# Patient Record
Sex: FEMALE | Race: WHITE | Employment: UNEMPLOYED | URBAN - METROPOLITAN AREA
[De-identification: names, ages, dates, MRNs, and addresses within clinical notes are randomized per-mention and may not be internally consistent; named-entity substitution may affect disease eponyms.]

---

## 2022-05-29 ENCOUNTER — HOSPITAL ENCOUNTER (INPATIENT)
Age: 42
LOS: 5 days | Discharge: HOME | DRG: 751 | End: 2022-06-03
Attending: PSYCHIATRY & NEUROLOGY | Admitting: PSYCHIATRY & NEUROLOGY
Payer: MEDICAID

## 2022-05-29 ENCOUNTER — APPOINTMENT (OUTPATIENT)
Dept: CT IMAGING | Age: 42
DRG: 751 | End: 2022-05-29
Payer: MEDICAID

## 2022-05-29 DIAGNOSIS — F29 PSYCHOSIS, UNSPECIFIED PSYCHOSIS TYPE (HCC): Primary | ICD-10-CM

## 2022-05-29 LAB
ACETAMINOPHEN LEVEL: <5 UG/ML (ref 10–30)
ALBUMIN SERPL-MCNC: 3.5 G/DL (ref 3.5–4.6)
ALP BLD-CCNC: 52 U/L (ref 40–130)
ALT SERPL-CCNC: 16 U/L (ref 0–33)
AMPHETAMINE SCREEN, URINE: NORMAL
ANION GAP SERPL CALCULATED.3IONS-SCNC: 7 MEQ/L (ref 9–15)
AST SERPL-CCNC: 15 U/L (ref 0–35)
BARBITURATE SCREEN URINE: NORMAL
BASOPHILS ABSOLUTE: 0.1 K/UL (ref 0–0.2)
BASOPHILS RELATIVE PERCENT: 0.5 %
BENZODIAZEPINE SCREEN, URINE: NORMAL
BILIRUB SERPL-MCNC: <0.2 MG/DL (ref 0.2–0.7)
BILIRUBIN URINE: NEGATIVE
BLOOD, URINE: NEGATIVE
BUN BLDV-MCNC: 16 MG/DL (ref 6–20)
CALCIUM SERPL-MCNC: 8.5 MG/DL (ref 8.5–9.9)
CANNABINOID SCREEN URINE: NORMAL
CHLORIDE BLD-SCNC: 108 MEQ/L (ref 95–107)
CHOLESTEROL, TOTAL: 155 MG/DL (ref 0–199)
CLARITY: CLEAR
CO2: 24 MEQ/L (ref 20–31)
COCAINE METABOLITE SCREEN URINE: NORMAL
COLOR: YELLOW
CREAT SERPL-MCNC: 0.58 MG/DL (ref 0.5–0.9)
EOSINOPHILS ABSOLUTE: 0.1 K/UL (ref 0–0.7)
EOSINOPHILS RELATIVE PERCENT: 1.4 %
ETHANOL PERCENT: NORMAL G/DL
ETHANOL: <10 MG/DL (ref 0–0.08)
GFR AFRICAN AMERICAN: >60
GFR NON-AFRICAN AMERICAN: >60
GLOBULIN: 2.2 G/DL (ref 2.3–3.5)
GLUCOSE BLD-MCNC: 102 MG/DL (ref 70–99)
GLUCOSE URINE: NEGATIVE MG/DL
HCG(URINE) PREGNANCY TEST: NEGATIVE
HCT VFR BLD CALC: 37.9 % (ref 37–47)
HDLC SERPL-MCNC: 59 MG/DL (ref 40–59)
HEMOGLOBIN: 12.4 G/DL (ref 12–16)
KETONES, URINE: NEGATIVE MG/DL
LDL CHOLESTEROL CALCULATED: 87 MG/DL (ref 0–129)
LEUKOCYTE ESTERASE, URINE: NEGATIVE
LYMPHOCYTES ABSOLUTE: 2.1 K/UL (ref 1–4.8)
LYMPHOCYTES RELATIVE PERCENT: 20.4 %
Lab: NORMAL
MCH RBC QN AUTO: 30.2 PG (ref 27–31.3)
MCHC RBC AUTO-ENTMCNC: 32.7 % (ref 33–37)
MCV RBC AUTO: 92.5 FL (ref 82–100)
METHADONE SCREEN, URINE: NORMAL
MONOCYTES ABSOLUTE: 0.6 K/UL (ref 0.2–0.8)
MONOCYTES RELATIVE PERCENT: 5.9 %
NEUTROPHILS ABSOLUTE: 7.4 K/UL (ref 1.4–6.5)
NEUTROPHILS RELATIVE PERCENT: 71.8 %
NITRITE, URINE: NEGATIVE
OPIATE SCREEN URINE: NORMAL
OXYCODONE URINE: NORMAL
PDW BLD-RTO: 13.8 % (ref 11.5–14.5)
PH UA: 5.5 (ref 5–9)
PHENCYCLIDINE SCREEN URINE: NORMAL
PLATELET # BLD: 293 K/UL (ref 130–400)
POTASSIUM SERPL-SCNC: 4.5 MEQ/L (ref 3.4–4.9)
PROPOXYPHENE SCREEN: NORMAL
PROTEIN UA: NEGATIVE MG/DL
RBC # BLD: 4.1 M/UL (ref 4.2–5.4)
SALICYLATE, SERUM: <0.3 MG/DL (ref 15–30)
SARS-COV-2, NAAT: NOT DETECTED
SODIUM BLD-SCNC: 139 MEQ/L (ref 135–144)
SPECIFIC GRAVITY UA: 1.01 (ref 1–1.03)
TOTAL CK: 49 U/L (ref 0–170)
TOTAL PROTEIN: 5.7 G/DL (ref 6.3–8)
TRIGL SERPL-MCNC: 43 MG/DL (ref 0–150)
TSH SERPL DL<=0.05 MIU/L-ACNC: 0.39 UIU/ML (ref 0.44–3.86)
URINE REFLEX TO CULTURE: NORMAL
UROBILINOGEN, URINE: 0.2 E.U./DL
WBC # BLD: 10.3 K/UL (ref 4.8–10.8)

## 2022-05-29 PROCEDURE — 81003 URINALYSIS AUTO W/O SCOPE: CPT

## 2022-05-29 PROCEDURE — 82550 ASSAY OF CK (CPK): CPT

## 2022-05-29 PROCEDURE — 80307 DRUG TEST PRSMV CHEM ANLYZR: CPT

## 2022-05-29 PROCEDURE — 80143 DRUG ASSAY ACETAMINOPHEN: CPT

## 2022-05-29 PROCEDURE — 70450 CT HEAD/BRAIN W/O DYE: CPT

## 2022-05-29 PROCEDURE — 80061 LIPID PANEL: CPT

## 2022-05-29 PROCEDURE — 87635 SARS-COV-2 COVID-19 AMP PRB: CPT

## 2022-05-29 PROCEDURE — 84703 CHORIONIC GONADOTROPIN ASSAY: CPT

## 2022-05-29 PROCEDURE — 99285 EMERGENCY DEPT VISIT HI MDM: CPT

## 2022-05-29 PROCEDURE — 80179 DRUG ASSAY SALICYLATE: CPT

## 2022-05-29 PROCEDURE — 85025 COMPLETE CBC W/AUTO DIFF WBC: CPT

## 2022-05-29 PROCEDURE — 80053 COMPREHEN METABOLIC PANEL: CPT

## 2022-05-29 PROCEDURE — 82077 ASSAY SPEC XCP UR&BREATH IA: CPT

## 2022-05-29 PROCEDURE — 36415 COLL VENOUS BLD VENIPUNCTURE: CPT

## 2022-05-29 PROCEDURE — 84443 ASSAY THYROID STIM HORMONE: CPT

## 2022-05-29 PROCEDURE — 1240000000 HC EMOTIONAL WELLNESS R&B

## 2022-05-29 RX ORDER — HALOPERIDOL 5 MG
5 TABLET ORAL EVERY 6 HOURS PRN
Status: DISCONTINUED | OUTPATIENT
Start: 2022-05-29 | End: 2022-06-03 | Stop reason: HOSPADM

## 2022-05-29 RX ORDER — TRAZODONE HYDROCHLORIDE 50 MG/1
50 TABLET ORAL NIGHTLY PRN
Status: DISCONTINUED | OUTPATIENT
Start: 2022-05-29 | End: 2022-06-03 | Stop reason: HOSPADM

## 2022-05-29 RX ORDER — MAGNESIUM HYDROXIDE/ALUMINUM HYDROXICE/SIMETHICONE 120; 1200; 1200 MG/30ML; MG/30ML; MG/30ML
30 SUSPENSION ORAL PRN
Status: DISCONTINUED | OUTPATIENT
Start: 2022-05-29 | End: 2022-06-03 | Stop reason: HOSPADM

## 2022-05-29 RX ORDER — ACETAMINOPHEN 325 MG/1
650 TABLET ORAL EVERY 4 HOURS PRN
Status: DISCONTINUED | OUTPATIENT
Start: 2022-05-29 | End: 2022-06-03 | Stop reason: HOSPADM

## 2022-05-29 RX ORDER — HYDROXYZINE PAMOATE 50 MG/1
50 CAPSULE ORAL EVERY 6 HOURS PRN
Status: DISCONTINUED | OUTPATIENT
Start: 2022-05-29 | End: 2022-06-03 | Stop reason: HOSPADM

## 2022-05-29 RX ORDER — HYDROXYZINE HYDROCHLORIDE 50 MG/ML
50 INJECTION, SOLUTION INTRAMUSCULAR EVERY 6 HOURS PRN
Status: DISCONTINUED | OUTPATIENT
Start: 2022-05-29 | End: 2022-06-03 | Stop reason: HOSPADM

## 2022-05-29 RX ORDER — BENZTROPINE MESYLATE 1 MG/ML
2 INJECTION INTRAMUSCULAR; INTRAVENOUS 2 TIMES DAILY PRN
Status: DISCONTINUED | OUTPATIENT
Start: 2022-05-29 | End: 2022-06-03 | Stop reason: HOSPADM

## 2022-05-29 RX ORDER — HALOPERIDOL 5 MG/ML
5 INJECTION INTRAMUSCULAR EVERY 6 HOURS PRN
Status: DISCONTINUED | OUTPATIENT
Start: 2022-05-29 | End: 2022-06-03 | Stop reason: HOSPADM

## 2022-05-29 ASSESSMENT — ENCOUNTER SYMPTOMS
ALLERGIC/IMMUNOLOGIC NEGATIVE: 1
EYE PAIN: 0
COLOR CHANGE: 0
TROUBLE SWALLOWING: 0
ABDOMINAL PAIN: 0
SHORTNESS OF BREATH: 0
APNEA: 0

## 2022-05-29 ASSESSMENT — PAIN - FUNCTIONAL ASSESSMENT: PAIN_FUNCTIONAL_ASSESSMENT: NONE - DENIES PAIN
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
## 2022-05-29 NOTE — ED NOTES
Pt left with security via a W/C with - nurse to CT scan of her head to be completed     Yfn Soria RN  05/29/22 4487

## 2022-05-29 NOTE — ED NOTES
Pt was given her dinner at the bedside, she is quiet and cooperative with even respirations no problems and no C/O any kind expressed.      Reine Holter, RN  05/29/22 7908

## 2022-05-29 NOTE — ED NOTES
Patient admitted to [facility] bed 2. Oriented to unit and changed into psych safe clothing. Patient cooperative with skin check, Covid screen and able to provide urine specimen. Patient noted to have LLE edema and states she stepped on a staple 2 weeks ago and received treatment at urgent care. Peeling area noted below toes on dorsal aspect of foot.       Dunia Arriaga RN  05/29/22 4103

## 2022-05-29 NOTE — ED NOTES
Pt is back from CT in the bed area quiet and cooperative with the CT being completed resting in the bed area.      Deena Aragon RN  05/29/22 6458

## 2022-05-29 NOTE — ED NOTES
Pt is resting in bed area quiet and cooperative with no problems and no C/O any kind expressed.      Guy Duarte RN  05/29/22 6213

## 2022-05-29 NOTE — ED NOTES
Call placed to lab and Naman Dorsey notified of new orders.       Laurent Phelan, MADISON  05/29/22 6567

## 2022-05-29 NOTE — ED NOTES
Call placed to 3W for bed assignment and spoke with Radha. Patient to be admitted to room 390. Report to be called after shift change.       William King RN  05/29/22 2120

## 2022-05-29 NOTE — ED NOTES
Patient reviewed with Dr. Jennifer Jenkins. Received order to admit patient to .      Madalyn Calhoun RN  05/29/22 4727
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
## 2022-05-29 NOTE — ED PROVIDER NOTES
[facility] ED  EMERGENCY DEPARTMENT ENCOUNTER      Pt Name: Torie Ayala  MRN: [id number]  [place] 1980  Date of evaluation: 5/29/2022  Provider: Delta Handing, PA-C    CHIEF COMPLAINT       Chief Complaint   Patient presents with    Psychiatric Evaluation     hearing voices (pt denies si/hi)          HISTORY OF PRESENT ILLNESS   (Location/Symptom, Timing/Onset, Context/Setting, Quality, Duration, Modifying Factors, Severity)  Note limiting factors. Torie Ayala is a 38 y.o. female who presents to the emergency department [facility] to patient stating auditory hallucinations. Patient openly admits to \"hearing ghosts\" but has been ongoing since her daughter overdosed. Patient states that ghost tell her to take care of herself and not kill herself, and she denies any suicidal or homicidal ideation. Patient denies any visual hallucinations. Patient stepped on a staple approximately 2 weeks ago but was on antibiotics for this and denies any other recent illnesses. HPI    Nursing Notes were reviewed. REVIEW OF SYSTEMS    (2-9 systems for level 4, 10 or more for level 5)     Review of Systems   Constitutional: Negative for diaphoresis and fever. HENT: Negative for hearing loss and trouble swallowing. Eyes: Negative for pain. Respiratory: Negative for apnea and shortness of breath. Cardiovascular: Negative for chest pain. Gastrointestinal: Negative for abdominal pain. Endocrine: Negative. Genitourinary: Negative for hematuria. Musculoskeletal: Negative for neck pain and neck stiffness. Skin: Negative for color change. Allergic/Immunologic: Negative. Neurological: Negative for dizziness and numbness. Hematological: Negative. Psychiatric/Behavioral: Positive for hallucinations. All other systems reviewed and are negative. Except as noted above the remainder of the review of systems was reviewed and negative. PAST MEDICAL HISTORY   History reviewed. No pertinent past medical history. SURGICAL HISTORY     History reviewed. No pertinent surgical history. CURRENT MEDICATIONS       Previous Medications    No medications on file       ALLERGIES     Latex    FAMILY HISTORY     History reviewed. No pertinent family history. SOCIAL HISTORY       Social History     Socioeconomic History    Marital status: Single     Spouse name: None    Number of children: None    Years of education: None    Highest education level: None   Occupational History    None   Tobacco Use    Smoking status: Current Every Day Smoker    Smokeless tobacco: Never Used   Substance and Sexual Activity    Alcohol use: Never    Drug use: Yes     Types: Methamphetamines (Crystal Meth)    Sexual activity: None   Other Topics Concern    None   Social History Narrative    None     Social Determinants of Health     Financial Resource Strain:     Difficulty of Paying Living Expenses: Not on file   Food Insecurity:     Worried About Running Out of Food in the Last Year: Not on file    Judy of Food in the Last Year: Not on file   Transportation Needs:     Lack of Transportation (Medical): Not on file    Lack of Transportation (Non-Medical):  Not on file   Physical Activity:     Days of Exercise per Week: Not on file    Minutes of Exercise per Session: Not on file   Stress:     Feeling of Stress : Not on file   Social Connections:     Frequency of Communication with Friends and Family: Not on file    Frequency of Social Gatherings with Friends and Family: Not on file    Attends Caodaism Services: Not on file    Active Member of Clubs or Organizations: Not on file    Attends Club or Organization Meetings: Not on file    Marital Status: Not on file   Intimate Partner Violence:     Fear of Current or Ex-Partner: Not on file    Emotionally Abused: Not on file    Physically Abused: Not on file    Sexually Abused: Not on file   Housing Stability:     Unable to Pay for Housing in the Last Year: Not on file    Number of Places Lived in the Last Year: Not on file    Unstable Housing in the Last Year: Not on file       SCREENINGS        Jes Coma Scale  Eye Opening: Spontaneous  Best Verbal Response: Oriented  Best Motor Response: Obeys commands  Jes Coma Scale Score: 15               PHYSICAL EXAM    (up to 7 for level 4, 8 or more for level 5)     ED Triage Vitals [05/29/22 1618]   BP Temp Temp Source Heart Rate Resp SpO2 Height Weight   132/83 98.4 °F (36.9 °C) Oral 91 18 100 % 5' 9\" (1.753 m) 178 lb (80.7 kg)       Physical Exam  Vitals and nursing note reviewed. Constitutional:       General: She is not in acute distress. Appearance: She is well-developed. She is not diaphoretic. HENT:      Head: Normocephalic and atraumatic. Mouth/Throat:      Pharynx: No oropharyngeal exudate. Eyes:      General: No scleral icterus. Conjunctiva/sclera: Conjunctivae normal.      Pupils: Pupils are equal, round, and reactive to light. Neck:      Trachea: No tracheal deviation. Cardiovascular:      Rate and Rhythm: Normal rate. Heart sounds: Normal heart sounds. Pulmonary:      Effort: Pulmonary effort is normal. No respiratory distress. Breath sounds: Normal breath sounds. Abdominal:      General: Bowel sounds are normal. There is no distension. Palpations: Abdomen is soft. Musculoskeletal:         General: Normal range of motion. Cervical back: Normal range of motion and neck supple. Skin:     General: Skin is warm and dry. Findings: No erythema or rash. Neurological:      Mental Status: She is alert and oriented to person, place, and time. Cranial Nerves: No cranial nerve deficit. Motor: No abnormal muscle tone. Psychiatric:         Attention and Perception: She perceives auditory hallucinations. She does not perceive visual hallucinations.          Behavior: Behavior normal.         Thought Content: Thought content normal. Thought content does not include homicidal or suicidal ideation. Judgment: Judgment normal.         DIAGNOSTIC RESULTS     EKG: All EKG's are interpreted by the Emergency Department Physician who either signs or Co-signs this chart in the absence of a cardiologist.        RADIOLOGY:   Non-plain film images such as CT, Ultrasound and MRI are read by the radiologist. Plain radiographic images are visualized and preliminarily interpreted by the emergency physician with the below findings:        Interpretation per the Radiologist below, if available at the time of this note:    CT Head WO Contrast    (Results Pending)         ED BEDSIDE ULTRASOUND:   Performed by ED Physician - none    LABS:  Labs Reviewed   ACETAMINOPHEN LEVEL - Abnormal; Notable for the following components:       Result Value    Acetaminophen Level <5 (*)     All other components within normal limits   CBC WITH AUTO DIFFERENTIAL - Abnormal; Notable for the following components:    RBC 4.10 (*)     MCHC 32.7 (*)     Neutrophils Absolute 7.4 (*)     All other components within normal limits   COMPREHENSIVE METABOLIC PANEL - Abnormal; Notable for the following components:    Chloride 108 (*)     Anion Gap 7 (*)     Glucose 102 (*)     Total Protein 5.7 (*)     Globulin 2.2 (*)     All other components within normal limits   SALICYLATE LEVEL - Abnormal; Notable for the following components:    Salicylate, Serum <4.0 (*)     All other components within normal limits   TSH - Abnormal; Notable for the following components:    TSH 0.386 (*)     All other components within normal limits   COVID-19, RAPID   CK   ETHANOL   LIPID PANEL   PREGNANCY, URINE   URINE DRUG SCREEN   URINALYSIS WITH REFLEX TO CULTURE       All other labs were within normal range or not returned as of this dictation.     EMERGENCY DEPARTMENT COURSE and DIFFERENTIAL DIAGNOSIS/MDM:   Vitals:    Vitals:    05/29/22 1618   BP: 132/83   Pulse: 91   Resp: 18   Temp: 98.4 °F (36.9 °C)   TempSrc: Oral   SpO2: 100%   Weight: 178 lb (80.7 kg)   Height: 5' 9\" (1.753 m)         MDM      REASSESSMENT        Patient was seen and evaluated and admitted by psychiatry after medical clearance was obtained    CONSULTS:  IP CONSULT TO HOSPITALIST  IP CONSULT TO SOCIAL WORK    PROCEDURES:  Unless otherwise noted below, none     Procedures        FINAL IMPRESSION      1. Psychosis, unspecified psychosis type [facility])          DISPOSITION/PLAN   DISPOSITION Decision To Admit 05/29/2022 06:52:26 PM      PATIENT REFERRED TO:  No follow-up provider specified. DISCHARGE MEDICATIONS:  New Prescriptions    No medications on file     Controlled Substances Monitoring:     No flowsheet data found.     (Please note that portions of this note were completed with a voice recognition program.  Efforts were made to edit the dictations but occasionally words are mis-transcribed.)    Leonardo Jones PA-C (electronically signed)  Attending Emergency Physician            Leonardo Jones PA-C  05/29/22 0683

## 2022-05-30 PROCEDURE — 6370000000 HC RX 637 (ALT 250 FOR IP): Performed by: PSYCHIATRY & NEUROLOGY

## 2022-05-30 PROCEDURE — 1240000000 HC EMOTIONAL WELLNESS R&B

## 2022-05-30 RX ORDER — RISPERIDONE 0.5 MG/1
0.5 TABLET, FILM COATED ORAL 2 TIMES DAILY
Status: DISCONTINUED | OUTPATIENT
Start: 2022-05-30 | End: 2022-06-03 | Stop reason: HOSPADM

## 2022-05-30 RX ADMIN — RISPERIDONE 0.5 MG: 0.5 TABLET ORAL at 20:34

## 2022-05-30 RX ADMIN — NICOTINE POLACRILEX 2 MG: 2 GUM, CHEWING BUCCAL at 17:56

## 2022-05-30 RX ADMIN — RISPERIDONE 0.5 MG: 0.5 TABLET ORAL at 11:39

## 2022-05-30 RX ADMIN — NICOTINE POLACRILEX 2 MG: 2 GUM, CHEWING BUCCAL at 22:05

## 2022-05-30 RX ADMIN — NICOTINE POLACRILEX 2 MG: 2 GUM, CHEWING BUCCAL at 15:54

## 2022-05-30 ASSESSMENT — SLEEP AND FATIGUE QUESTIONNAIRES
SLEEP PATTERN: DIFFICULTY FALLING ASLEEP;RESTLESSNESS
DO YOU USE A SLEEP AID: NO
DO YOU HAVE DIFFICULTY SLEEPING: YES
AVERAGE NUMBER OF SLEEP HOURS: 6

## 2022-05-30 ASSESSMENT — PATIENT HEALTH QUESTIONNAIRE - PHQ9
SUM OF ALL RESPONSES TO PHQ QUESTIONS 1-9: 13
SUM OF ALL RESPONSES TO PHQ QUESTIONS 1-9: 13

## 2022-05-30 NOTE — GROUP NOTE
Group Therapy Note    Date: 5/30/2022    Group Start Time: 1900  Group End Time: 1945  Group Topic: Recreational    [facility]    [place]        Group Therapy Note    Attendees: 14/22         Patient's Goal:  To participate in activity group. Notes:  Patient participated in group with peers.      Status After Intervention:  Unchanged    Participation Level: Interactive    Participation Quality: Appropriate and Attentive      Speech:  normal      Thought Process/Content: Logical      Affective Functioning: Congruent      Mood: euthymic      Level of consciousness:  Alert and Attentive      Response to Learning: Progressing to goal      Endings: None Reported    Modes of Intervention: Activity      Discipline Responsible: [address] [facility] [address] Tech      Signature:  [place]

## 2022-05-30 NOTE — PROGRESS NOTES
Pt reports depression & anxiety as being 1/10. Denies SI/HI/VH. , Pt does admit to AH,States she hears faint voices ( reports 12 yo son can hear them also.) Reports the voices are there to \"help\" her not hurt her. Also reports the voices are improved after 1st dose of RisperdalPt reports 6-8 hrs sleep each night. Pt reports she will attend groups tomorrow.

## 2022-05-30 NOTE — ED NOTES
Unable to reach [facility] at this time for belongings and transport to [facility]  05/29/22 1732

## 2022-05-30 NOTE — PROGRESS NOTES
Pt was given medication education on Risperdal. Pt informed to come to staff with questions. Pt agrees.  Electronically signed by Mera Murphy LPN on 1/59/6735 at 2:67 PM
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
## 2022-05-30 NOTE — GROUP NOTE
Group Therapy Note    Date: 5/30/2022    Group Start Time: 8962  Group End Time: 1493  Group Topic: Healthy Living/Wellness    [facility]    Tahmina Calderon        Group Therapy Note    Attendees: 16/21         Patient's Goal:  To practice using coping skills. Notes:  Patient participated in group with peers.      Status After Intervention:  Unchanged    Participation Level: Interactive    Participation Quality: Appropriate, Attentive and Supportive      Speech:  normal      Thought Process/Content: Logical      Affective Functioning: Congruent      Mood: euthymic      Level of consciousness:  Alert and Attentive      Response to Learning: Progressing to goal      Endings: None Reported    Modes of Intervention: Education      Discipline Responsible: [address], [employer] Tech      Signature:  Tahmina Calderon

## 2022-05-30 NOTE — ED NOTES
[place] called to bring belongings and transport to [facility], [address] [facility]  05/29/22 5705

## 2022-05-30 NOTE — PROGRESS NOTES
Patient arrived to the unit via wheelchair accompanied by staff. Skin assessment and contraband search complete by this nurse and Saul Humphreys. No contraband found. Pts left lower extremity is swollen. Pt reports she stepped on a staple two weeks ago and received antibiotics at the urgent care. On the bottom of pts left foot there is some peeling around the scab where she stepped on the staple. No redness noted.

## 2022-05-30 NOTE — PROGRESS NOTES
Pt. declined to attend the 0900 community meeting, despite staff encouragement.  Goal - \"To see the Doctor\" Electronically signed by DEREK Martinez on 5/30/2022 at 10:09 AM

## 2022-05-30 NOTE — CONSULTS
[place] [address] MEDICINE    HISTORY AND PHYSICAL EXAM    PATIENT NAME:  Giselle Ayers    MRN:  [id number]  SERVICE DATE:  5/30/2022   SERVICE TIME:  9:14 AM    Primary Care Physician: No primary care provider on file. SUBJECTIVE  CHIEF COMPLAINT:  Medically appropriate for inpatient psychiatry admission. Consult for medical H/P encounter. HPI:  This is a 38 y.o. female who presents with PMHx anxiety, presented to emergency room pink slipped by [facility] due to [facility]. Reports hearing ghosts. Symptoms ongoing since her daughter overdosed. CTH negative. Patient cleared from emergency room for psychiatric care. Reports she recently stepped on a staple and was treated with keflex. Reports small wound to area that still causes pain when ambulating. No open areas however LLE is edematous without erythema. Patient Seen, Chart, Labs, Radiologystudies, and Consults reviewed. Patient denies headache, chest pain, shortness of breath, N/V/D/C, fever/chills. PAST MEDICAL HISTORY:  History reviewed. No pertinent past medical history. PAST SURGICAL HISTORY:  History reviewed. No pertinent surgical history. FAMILY HISTORY:  History reviewed. No pertinent family history.   SOCIAL HISTORY:    Social History     Socioeconomic History    Marital status: Single     Spouse name: Not on file    Number of children: Not on file    Years of education: Not on file    Highest education level: Not on file   Occupational History    Not on file   Tobacco Use    Smoking status: Current Every Day Smoker    Smokeless tobacco: Never Used   Substance and Sexual Activity    Alcohol use: Never    Drug use: Yes     Types: Methamphetamines (Crystal Meth)    Sexual activity: Not on file   Other Topics Concern    Not on file   Social History Narrative    Not on file     Social Determinants of Health     Financial Resource Strain:     Difficulty of Paying Living Expenses: Not on file   Food Insecurity:     Worried About Running Out of Food in the Last Year: Not on file    Ran Out of Food in the Last Year: Not on file   Transportation Needs:     Lack of Transportation (Medical): Not on file    Lack of Transportation (Non-Medical):  Not on file   Physical Activity:     Days of Exercise per Week: Not on file    Minutes of Exercise per Session: Not on file   Stress:     Feeling of Stress : Not on file   Social Connections:     Frequency of Communication with Friends and Family: Not on file    Frequency of Social Gatherings with Friends and Family: Not on file    Attends Scientology Services: Not on file    Active Member of Clubs or Organizations: Not on file    Attends Club or Organization Meetings: Not on file    Marital Status: Not on file   Intimate Partner Violence:     Fear of Current or Ex-Partner: Not on file    Emotionally Abused: Not on file    Physically Abused: Not on file    Sexually Abused: Not on file   Housing Stability:     Unable to Pay for Housing in the Last Year: Not on file    Number of Places Lived in the Last Year: Not on file    Unstable Housing in the Last Year: Not on file     MEDICATIONS:    Current Facility-Administered Medications   Medication Dose Route Frequency Provider Last Rate Last Admin    acetaminophen (TYLENOL) tablet 650 mg  650 mg Oral Q4H PRN Leopold Garner, MD        magnesium hydroxide (MILK OF MAGNESIA) 400 MG/5ML suspension 30 mL  30 mL Oral Daily PRN Leopold Garner, MD        aluminum & magnesium hydroxide-simethicone (MAALOX) 200-200-20 MG/5ML suspension 30 mL  30 mL Oral PRN Leopold Garner, MD        haloperidol (HALDOL) tablet 5 mg  5 mg Oral Q6H PRN Leopold Garner, MD        Or    haloperidol lactate (HALDOL) injection 5 mg  5 mg IntraMUSCular Q6H PRN Leopold Garner, MD        benztropine mesylate (COGENTIN) injection 2 mg  2 mg IntraMUSCular BID PRN Leopold Garner, MD        traZODone (DESYREL) tablet 50 mg  50 mg Oral Nightly PRN Leopold Garner, MD [place] hydrOXYzine (VISTARIL) injection 50 mg  50 mg IntraMUSCular Q6H PRN Bhavya Haney MD        Or    hydrOXYzine (VISTARIL) capsule 50 mg  50 mg Oral Q6H PRN Bhavya Haney MD           ALLERGIES: Latex    REVIEW OF SYSTEM:   ROS as noted in HPI, 12 point ROS reviewed and otherwise negative. OBJECTIVE  PHYSICAL EXAM: /60   Pulse 94   Temp 99.3 °F (37.4 °C) (Oral)   Resp 14   Ht 5' 9\" (1.753 m)   Wt 178 lb (80.7 kg)   LMP 04/28/2022 (Approximate)   SpO2 99%   BMI 26.29 kg/m²   CONSTITUTIONAL:  awake, alert, cooperative, no apparent distress, and appears stated age  EYES:  Lids and lashes normal, conjunctiva normal  ENT:  Normocephalic, without obvious abnormality, atraumatic, sinuses nontender on palpation, external ears without lesions, oral pharynx with moist mucus membranes, tonsils without erythema or exudates, gums normal and good dentition. NECK:  Supple, symmetrical, trachea midline  LUNGS: Clear to auscultation bilaterally, no crackles or wheezing  CARDIOVASCULAR:  Regular rate and rhythm, normal S1 and S2  ABDOMEN:  Normal bowel sounds, soft, non-distended, non-tender  MUSCULOSKELETAL:  There is no redness, warmth, or swelling of the joints. NEUROLOGIC:  Awake, alert, oriented to name, place and time. SKIN:  Warm and dry    DATA:     Diagnostic tests reviewed for today's visit:    Most recent labs and imaging results reviewed. ASSESSMENT AND PLAN   Psychosis [facility])  Patient admitted to behavorial health for evaluation and treatment     Encounter for H&P    LLE edema: Check venous US to r/o DVT    This is only a history and physical examination and not medical management. The patient is to contact and follow up with their primary care physician and go over any abnormal labs, imaging, findings, medical concerns, or conditions that we have and have not addressed during this encounter.     Plan of care discussed with: patient    SIGNATURE: ANABELA Wahl NP  DATE: May 30, 2022  TIME: 9:14 AM

## 2022-05-30 NOTE — PROGRESS NOTES
Pt. refused to attend the 1000 skills group, despite staff encouragement. Electronically signed by Ranae Scheuermann, [address] on 5/30/2022 at 11:54 AM

## 2022-05-30 NOTE — CARE COORDINATION
Independent X  Dependent    Minimal Assist      Comments:  previous admissions    Psychosocial High Risk Factors (check all that apply)     Unable to obtain meds   Chronic illness/pain    Substance abuse   Lack of Family Support  x   Financial stress x  Isolation   Inadequate Community Resources   Suicide attempt(s)   Not taking medications  X   Victim of crime   Developmental Delay  Unable to manage personal needs    Age 72 or older   Homeless   No transportation    Readmission within 30 days  Unemployment   Traumatic Event     Family/Supports identified:does have family support as well as friends.     Sexual Orientation:  Did not disclose      Patient Strengths: able to articulate needs     Patient Barriers:  hard to control moods/ reports of auditory hallucinations that she is reporting is ghosts     Safety plan: none at this time, patient reports she is safe.      CMHC/MH history: history of hospitalizations, does not follow through with outpatient [address] [place] [address] Shay of Care:  medication management, group/individual therapies, family meetings, psycho -education, treatment team meetings to assist with stabilization     Initial Discharge Plan:  able to return home aunt and uncle.     Clinical Summary: patient reports that she has been having issues with auditory hallucinations and that she has been having a hard time coping since her daughter had an OD about a year ago. She reports that ghosts are telling her to help her and it has been increasing but having a hard time with regulating her emotions. Because of this patients child was taken from her temporarily and this is causing some distress. Patient was taken to [facility] to due above conditions and admitted for observation.

## 2022-05-30 NOTE — PROGRESS NOTES
Patient had a sad affect, she was worrisome, tearful but she was cooperative and agreeable to do her leisure assessment. Patient stated she does not know if she is depressed but she is stressed. Stressors are not having custody of her son, being homeless, staying at a hotel and she hears voices, stating Randeescott Gamble want me to help them and I can't help them\". Patient stated \"their is voices, ghosts, spirits or something\". She stated she has not been the same since she saw her daughter overdose last year and she had to bring her back. She is also grieving the death of her grandfather last year. She denies using drugs or drinking alcohol. She has some relationship issues with her mom, brother and her nephew. Her son is staying with her nephew at this time. She is hopeful about her recovery. She enjoys gardening and being outside.  Electronically signed by Leanne Huffman, [address] on 5/30/2022 at 2:27 PM

## 2022-05-30 NOTE — CARE COORDINATION
Brief Intervention and Referral to Treatment Summary    Patient was provided PHQ-9, AUDIT-C and DAST Screening:      PHQ-9 Score: 7  AUDIT-C Score:  0  DAST Score:  0    Patients substance use is considered     Low Risk/Healthy x  Moderate Risk   Harmful  Dependent    Patients depression is considered:     Minimal  Mild   Moderate x  Moderately Severe   Severe    Brief Education Was Provided    Patient was receptive  Patient was not receptive x      Brief Intervention Is Provided (Only for AUDIT-C or DAST)     Patient reports readiness to decrease and/or stop use and a plan was discussed   Patient denies readiness to decrease and/or stop use and a plan was not discussed x      Recommendations/Referrals for Brief and/or Specialized Treatment Provided to Patient   Patient did not disclose any sort of substance abuse issues and as a result not information was given to patient at this time.

## 2022-05-31 ENCOUNTER — APPOINTMENT (OUTPATIENT)
Dept: ULTRASOUND IMAGING | Age: 42
DRG: 751 | End: 2022-05-31
Payer: MEDICAID

## 2022-05-31 LAB
EKG ATRIAL RATE: 86 BPM
EKG P AXIS: -2 DEGREES
EKG P-R INTERVAL: 132 MS
EKG Q-T INTERVAL: 330 MS
EKG QRS DURATION: 78 MS
EKG QTC CALCULATION (BAZETT): 394 MS
EKG R AXIS: 66 DEGREES
EKG T AXIS: 51 DEGREES
EKG VENTRICULAR RATE: 86 BPM

## 2022-05-31 PROCEDURE — 99232 SBSQ HOSP IP/OBS MODERATE 35: CPT | Performed by: PSYCHIATRY & NEUROLOGY

## 2022-05-31 PROCEDURE — 93005 ELECTROCARDIOGRAM TRACING: CPT | Performed by: PSYCHIATRY & NEUROLOGY

## 2022-05-31 PROCEDURE — 6370000000 HC RX 637 (ALT 250 FOR IP): Performed by: PSYCHIATRY & NEUROLOGY

## 2022-05-31 PROCEDURE — 1240000000 HC EMOTIONAL WELLNESS R&B

## 2022-05-31 PROCEDURE — 93971 EXTREMITY STUDY: CPT

## 2022-05-31 PROCEDURE — 93010 ELECTROCARDIOGRAM REPORT: CPT | Performed by: INTERNAL MEDICINE

## 2022-05-31 RX ADMIN — RISPERIDONE 0.5 MG: 0.5 TABLET ORAL at 09:24

## 2022-05-31 RX ADMIN — NICOTINE POLACRILEX 2 MG: 2 GUM, CHEWING BUCCAL at 18:17

## 2022-05-31 RX ADMIN — NICOTINE POLACRILEX 2 MG: 2 GUM, CHEWING BUCCAL at 13:09

## 2022-05-31 RX ADMIN — NICOTINE POLACRILEX 2 MG: 2 GUM, CHEWING BUCCAL at 09:58

## 2022-05-31 RX ADMIN — NICOTINE POLACRILEX 2 MG: 2 GUM, CHEWING BUCCAL at 15:57

## 2022-05-31 RX ADMIN — RISPERIDONE 0.5 MG: 0.5 TABLET ORAL at 21:27

## 2022-05-31 NOTE — H&P
Department of Psychiatry  History and Physical - Adult     CHIEF COMPLAINT:  Auditory hallucinations of 5 people , they are making her anxious and more paranoid then she is    History obtained from:  patient, electronic medical record    Patient was seen after discussing with the treatment team and reviewing the chart\    CIRCUMSTANCES OF ADMISSION: Patient was convinced by family to seek help [facility]. And they pinkslipped her. Patient reports 9 months of hearing voices , which are distracting maddening. Patient believes that voices belong to spirits. She said that her son independently hears voices of same, however they removed him from her  ([facility])    HISTORY OF PRESENT ILLNESS:      The patient is a 38 y.o. female with significant past history of mehamphetamine use, presents on a pink slip by [facility] for subacute hallucinations. Patient reports poor sleep, racing thoughts, panic attacks as well as voices. She believes these voices are the reality, she also believes hson was hearing them as well. Patient reports these voices have caused grave concerns. Clinical Summary:  per ed      Patient presented to the ED for a psychiatric evaluation after she was assessed and pink slipped by the [facility] team. Patient reports she has been experiencing auditory hallucinations for the past 9 months. She describes hearing five external voices and refers to each voice in context to their place in \"the family. \" States she hears \"the older father, mother and children. \" Patient describes the voices as male and female that speak truth to her. States \"it's crazy, they don't tell me to hurt myself or anything but they help me. \" Describes hearing the older male voice told her to check the washing machine which shouldn't have completed it's cycle but when she checked it was done.  The female voice told me while I was at the store to check my bank account because my tax return was there, it wasn't supposed to be their another week but it was there like she said. \" Patient verbalizes these voices do things to prove to her that they are real. States the voices also tell me \"the spirit world wants you. \" Patient verbalizes that her [de-identified] year old son and her dog also hear these voices. Patient cooperative with assessment and answers questions appropriately. Thought process organized with tangential speech. Appears to smile when discussing her psychosis. Tearful at times when she discusses the situation with her children and family. Denies SI, denies HI. Denies any self harm or suicide attempt prior to admit. Denies any history of suicide attempts or inpatient or outpatient behavioral health treatment. Affect incongruent with good eye contact. Patient states she would like to be evaluated to attempt to \"get her head straight\" so her family will allow her to get her son back. Patient admits she has been placing herself in danger recently and recognizes the need for her son to be with family while she receives treatment.          Stressors:lost her 10 yo in a custody torres    The patient is not currently receiving care for the above psychiatric illness. Medications Prior to Admission:   No medications prior to admission.     Compliance:none    Psychiatric Review of Systems       Depression: yes     Veena or Hypomania:  yes -      Panic Attacks:  no     Phobias:  no     Obsessions and Compulsions:  no     PTSD :      Hallucinations:  no     Delusions:  no    Substance Abuse History:  ETOH: no   Marijuana: denies  Opiates: done   Other Drugs: amphetamines      Past Psychiatric History:  Prior Diagnosis:  Meth duced psychossis  Psychiatrist: [id number]  Therapist:;;;;;;;;;;;;;;;;;;;;;;;;;;;;;;;;;;;;;;;;;;;;;;;;;;;;;;;;;;;;;;;;;;;;;;;;;;;;;;;;;;;;;;;;;;;;;;;;;;;;;;;;;;;;;;;;;;;;;;;;;;;;;;;;;;;;;;;;;;;;;;;;;;;;;;;;;;;;;;;;;;;;;;;;;;;;;;;;;;;;;;;;;;;;;;;;;;;;;;;;;;;;;;;;;;;;;;;;;;;;;;;;;;;;;;;;;;;;;;;;;;;;;;;;;;;;;;;;;;;;;;;;;;;;;;;;;;;;;;;;;;;;;;;;;;;;;;;;;;;;;;;;;;;;;;;;;;;;;;;;;;;;;;;;;;;;;;;;;;;;;;;;;;;;;;;;;;;;;;;;;;;;;;;;;;;;;;;;;;;;;;;;;;;;;;;;;;;;;;;;;;;;;;;;;;;;;;;;;;;;;;;;;;;;;;;;;;;;;;;;;;;;;;;;;;;;;;;;;;;;;;;;;;;;;;;;;;;;;;;;;;;;;;;;;;;;;;;;;;;;;;;;;;;;;;;;;;;;;;;;;;;;;;;;;;;;;;;;;;;;;;;;;;;;;;;;;;;;;;;;;;;;;;;;;;;;;;;;;;;;;;;;;;;;;;;;;;;;;;;;;;;;;;;;;;;;;;;;;;;;;;;;;;;;;;;;;;;;;;;;;;;;;;;;;;;;;;;;;;;;;;;;;;;;;;;;;;;;;;;;;;;;;;;;;;;;;;;;;;;;;;;;;;;;;;;;;;;;;;;;;;;;;;;;;;;;;;;;;;;;;;;;;;;;;;;;;;;;;;;;;;;;;;;;;;;;;;;;;;;;;;;;;;;;;;;;;;;  Hospitalization: yes  Hx of Suicidal Attempts: yes  Hx of violence:  yes  ECT: yes  Previous discontinued Psychiatric Med Trials:     Past Medical History:    History reviewed. No pertinent past medical history. Past Surgical History:    History reviewed. No pertinent surgical history. Allergies:   Latex    Family History  History reviewed. No pertinent family history. Social History:  Born and Raised: [place]  Describes Childhood:   neglected  Education: [employer]  Employment: Unemployed, not seeking work  Relationships:   Children: 2 children  Current Support: parents    Legal Hx: pending charges  Access to weapons?:  No      EXAMINATION: medically stable  Medically cleared by ed    REVIEW OF SYSTEMS:    ROS:  [x] All negative/unchanged except if checked.  Explain positive(checked items) below:  [] Constitutional  [] Eyes  [] Ear/Nose/Mouth/Throat  [] Respiratory  [] CV  [] GI  []   [] Musculoskeletal  [] Skin/Breast  [] Neurological  [] Endocrine  [] Heme/Lymph  [] Allergic/Immunologic    Explanation:     Vitals:  BP (!) 117/55   Pulse 100   Temp 100.2 °F (37.9 °C)   Resp 16   Ht 5' 9\" (1.753 m)   Wt 178 lb (80.7 kg)   LMP 04/28/2022 (Approximate)   SpO2 100%   BMI 26.29 kg/m²      Neurologic Exam:   Muscle Strength & Tone: full ROM  Gait: normal gait   Involuntary Movements: Yes    Mental Status Examination:    Level of consciousness:  within normal limits   Appearance:  ill-appearing  Behavior/Motor:  psychomotor retardation  Attitude toward examiner:  cooperative  Speech:  spontaneous and normal volume   Mood: angry, euphoric and labile  Affect:  mood congruent  Thought processes:  tangential   Thought content:  Preoccupied with auditory hallucinationsd  Homocidal ideation denies  Suicidal Ideation:  denies suicidal ideation  Delusions:  paranoid  Cognition:  oriented to person, place, and time   Concentration intact  Memory impaired immediate recall, recent memory and remote memory  Insight poorJudgement good   Fund of Knowledge adequate    Mini Mental Status 24/30      DIAGNOSIS:  Bipolar disorder   substance induced psychosis  Stimulant use disorder    RISK ASSESSMENT:    SUICIDE RISK ASSESSMENT:  HOMICIDE: denies  AGITATION/VIOLENCE: no  ELOPEMENT: no    LABS: REVIEWED TODAY:  Recent Labs     05/29/22  1659   WBC 10.3   HGB 12.4        Recent Labs     05/29/22  1659      K 4.5   *   CO2 24   BUN 16   CREATININE 0.58   GLUCOSE 102*     Recent Labs     05/29/22  1659   BILITOT <0.2   ALKPHOS 52   AST 15   ALT 16     Lab Results   Component Value Date    LABAMPH Neg 05/29/2022    BARBSCNU Neg 05/29/2022    LABBENZ Neg 05/29/2022    COCAINESCRN Negative 04/06/2022    LABMETH Neg 05/29/2022    OPIATESCREENURINE Neg 05/29/2022    PHENCYCLIDINESCREENURINE Neg 05/29/2022    ETOH <10 05/29/2022     Lab Results   Component Value Date    TSH 0.386 05/29/2022     No results found for: LITHIUM  No results found for: VALPROATE, CBMZ  No results found for: LITHIUM, VALPROATE    FURTHER LABS ORDERED :      Radiology none  CT Head WO Contrast    Result Date: 5/29/2022  Patient: Scottkaitlynn Wolfe  Time Out: 20:15 Exam(s): CT HEAD Without Contrast  EXAM:   CT Head Without Intravenous Contrast  CLINICAL HISTORY:    Reason for exam: new onset psychosis. Chief complaint new onset psychosis  TECHNIQUE:   Axial computed tomography images of the head/brain without intravenous contrast.  All CT scan at this facility use dose modulation, iterative reconstruction, and/or weight based dosing when appropriate to reduce radiation dose to as low as reasonably achievable. COMPARISON:   No relevant prior studies available. FINDINGS:   Brain:  No acute hemorrhage, large hypodensity, or significant mass effect. Ventricles:  No significant abnormality. Bones/joints:  No acute abnormality. Soft tissues:  No significant abnormality. Sinuses:  No significant abnormality. Mastoid air cells:  No significant abnormality. Electronically signed by Lula Awad MD on 05-29-22 at 2015      No acute intracranial abnormality. EKG: none TRACING REVIEWED    TREATMENT PLAN:    Risk Management:  close watch    Collateral Information:  Will obtain collateral information from the family or friends. Will obtain medical records as appropriate from out patient providers  Will consult the hospitalist for a physical exam to rule out any co-morbid physical condition. Home medication Reconciled       New Medications started during this admission :    See orders  Prn Haldol 5mg and Vistaril 50mg q6hr for extreme agitation. Trazodone as ordered for insomnia  Vistaril as ordered for anxiety  Discussed with the patient risk, benefit, alternative and common side effects for the  proposed medication treatment. Patient is consenting to the treatment.     Psychotherapy:   Encourage participation in milieu and group therapy  Individual therapy as needed        Behavioral Services  Medicare Certification      Admission Day 1  I certify that this patient's inpatient psychiatric hospital admission is medically necessary for:     (1) treatment which could reasonably be expected to improve this patient's condition, or     (2) diagnostic study or its equivalent.        Electronically signed by Zack Torres MD on 5/31/2022 at 12:42 AM

## 2022-05-31 NOTE — PROGRESS NOTES
Patient has been out with peers. States she's much better. Said the medication helped her almost right away; no more voices, no depression or anxiety. Denies all. Patient asked for prune juice for constipation and received it  Left leg still significantly swollen compared to the other, +1 edema. DUP negative for DVT.

## 2022-05-31 NOTE — GROUP NOTE
Group Therapy Note    Date: 5/31/2022    Group Start Time: 1100  Group End Time: 3118  Group Topic: Psychotherapy    [facility]    REBEKAH Vanessa        Group Therapy Note    Attendees: 8/22 (two at ect)          Patient's Goal:  \"discharge and go to a custody hearing for my son. \"    Notes:  Left early to see MD    Status After Intervention:  Unchanged    Participation Level: Minimal    Participation Quality: Appropriate      Speech:  normal      Thought Process/Content: Delusional      Affective Functioning: Flat      Mood: anxious      Level of consciousness:  Alert      Response to Learning: Progressing to goal      Endings: None Reported    Modes of Intervention: Education      Discipline Responsible: /Counselor      Signature:  REBEKAH Vanessa

## 2022-05-31 NOTE — PROGRESS NOTES
Behavioral Services  Medicare Certification Upon Admission    I certify that this patient's inpatient psychiatric hospital admission is medically necessary for:    [x] (1) Treatment which could reasonably be expected to improve this patient's condition,       [x] (2) Or for diagnostic study;     AND     [x](2) The inpatient psychiatric services are provided while the individual is under the care of a physician and are included in the individualized plan of care. Estimated length of stay/service 3-5 days    Plan for post-hospital care OP care      Electronically signed by Larry Santos MD on 5/31/2022 at 4:41 PM                 [address] NOTE       5/31/2022     Patient was seen and examined in person, Chart reviewed   Patient's case discussed with staff/team    Chief Complaint: psychosis    Interim History:     Pt has been seclusive in her room  Appeared paranoid and suspicious  Less intense AH present since taking medication  Pt sleep was disturbed  Worried about the custody issue  Feeling sad and depressed  Appetite:   [] Normal/Unchanged  [] Increased  [x] Decreased      Sleep:       [] Normal/Unchanged  [] Fair       [x] Poor              Energy:    [] Normal/Unchanged  [] Increased  [x] Decreased        SI [] Present  [x] Absent    HI  []Present  [x] Absent     Aggression:  [] yes  [] no    Patient is [] able  [x] unable to CONTRACT FOR SAFETY     PAST MEDICAL/PSYCHIATRIC HISTORY:   History reviewed. No pertinent past medical history. FAMILY/SOCIAL HISTORY:  History reviewed. No pertinent family history.   Social History     Socioeconomic History    Marital status: Single     Spouse name: Not on file    Number of children: Not on file    Years of education: Not on file    Highest education level: Not on file   Occupational History    Not on file   Tobacco Use    Smoking status: Current Every Day Smoker    Smokeless tobacco: Never Used   Substance and Sexual Activity    Alcohol use: Never    Drug use: Yes     Types: Methamphetamines (Crystal Meth)    Sexual activity: Not on file   Other Topics Concern    Not on file   Social History Narrative    Not on file     Social Determinants of Health     Financial Resource Strain:     Difficulty of Paying Living Expenses: Not on file   Food Insecurity:     Worried About Running Out of Food in the Last Year: Not on file    Judy of Food in the Last Year: Not on file   Transportation Needs:     Lack of Transportation (Medical): Not on file    Lack of Transportation (Non-Medical): Not on file   Physical Activity:     Days of Exercise per Week: Not on file    Minutes of Exercise per Session: Not on file   Stress:     Feeling of Stress : Not on file   Social Connections:     Frequency of Communication with Friends and Family: Not on file    Frequency of Social Gatherings with Friends and Family: Not on file    Attends Pentecostalism Services: Not on file    Active Member of [address] [place] [address] or Organizations: Not on file    Attends Club or Organization Meetings: Not on file    Marital Status: Not on file   Intimate Partner Violence:     Fear of Current or Ex-Partner: Not on file    Emotionally Abused: Not on file    Physically Abused: Not on file    Sexually Abused: Not on file   Housing Stability:     Unable to Pay for Housing in the Last Year: Not on file    Number of [place] in the Last Year: Not on file    Unstable Housing in the Last Year: Not on file           ROS:  [x] All negative/unchanged except if checked.  Explain positive(checked items) below:  [] Constitutional  [] Eyes  [] Ear/Nose/Mouth/Throat  [] Respiratory  [] CV  [] GI  []   [] Musculoskeletal  [] Skin/Breast  [] Neurological  [] Endocrine  [] Heme/Lymph  [] Allergic/Immunologic    Explanation:     MEDICATIONS:    Current Facility-Administered Medications:     risperiDONE (RISPERDAL) tablet 0.5 mg, 0.5 mg, Oral, BID, Nabeel Russell MD Gm, 0.5 mg at 05/31/22 3152    nicotine polacrilex (NICORETTE) gum 2 mg, 2 mg, Oral, Q2H PRN, Phyllis Worrell MD, 2 mg at 05/31/22 1557    acetaminophen (TYLENOL) tablet 650 mg, 650 mg, Oral, Q4H PRN, Renetta Morales MD    magnesium hydroxide (MILK OF MAGNESIA) 400 MG/5ML suspension 30 mL, 30 mL, Oral, Daily PRN, Renetta Morales MD    aluminum & magnesium hydroxide-simethicone (MAALOX) 200-200-20 MG/5ML suspension 30 mL, 30 mL, Oral, PRN, Renetta Morales MD    haloperidol (HALDOL) tablet 5 mg, 5 mg, Oral, Q6H PRN **OR** haloperidol lactate (HALDOL) injection 5 mg, 5 mg, IntraMUSCular, Q6H PRN, Renetta Morales MD    benztropine mesylate (COGENTIN) injection 2 mg, 2 mg, IntraMUSCular, BID PRN, Renetta Morales MD    traZODone (DESYREL) tablet 50 mg, 50 mg, Oral, Nightly PRN, Renetta Morales MD    hydrOXYzine (VISTARIL) injection 50 mg, 50 mg, IntraMUSCular, Q6H PRN **OR** hydrOXYzine (VISTARIL) capsule 50 mg, 50 mg, Oral, Q6H PRN, Renetta Morales MD      Examination:  /74   Pulse (!) 105   Temp 99.2 °F (37.3 °C) (Oral)   Resp 16   Ht 5' 9\" (1.753 m)   Wt 178 lb (80.7 kg)   LMP 04/28/2022 (Approximate)   SpO2 96%   BMI 26.29 kg/m²   Gait - steady  Medication side effects(SE): no    Mental Status Examination:    Level of consciousness:  within normal limits   Appearance:  fair grooming and fair hygiene  Behavior/Motor:  psychomotor retardation  Attitude toward examiner:  withdrawn  Speech:  slow   Mood: dysthymic  Affect:  anxious  Thought processes:  slow   Thought content:  Delusions:  paranoid  Perceptual Disturbance:  auditory  Cognition:  oriented to person, place, and time   Concentration poor  Insight fair   Judgement fair     ASSESSMENT:   Patient symptoms are:  [] Well controlled  [] Improving  [] Worsening  [] No change      Diagnosis:   Bipolar depression    LABS:    Recent Labs     05/29/22  1659   WBC 10.3   HGB 12.4        Recent Labs 05/29/22  1659      K 4.5   *   CO2 24   BUN 16   CREATININE 0.58   GLUCOSE 102*     Recent Labs     05/29/22  1659   BILITOT <0.2   ALKPHOS 52   AST 15   ALT 16     Lab Results   Component Value Date    LABAMPH Neg 05/29/2022    BARBSCNU Neg 05/29/2022    LABBENZ Neg 05/29/2022    COCAINESCRN Negative 04/06/2022    LABMETH Neg 05/29/2022    OPIATESCREENURINE Neg 05/29/2022    PHENCYCLIDINESCREENURINE Neg 05/29/2022    ETOH <10 05/29/2022     Lab Results   Component Value Date    TSH 0.386 05/29/2022     No results found for: LITHIUM  No results found for: VALPROATE, CBMZ        Treatment Plan:  Reviewed current Medications with the patient. Medication as ordered  Risks, benefits, side effects, drug-to-drug interactions and alternatives to treatment were discussed. Collateral information:   CD evaluation  Encourage patient to attend group and other milieu activities.   Discharge planning discussed with the patient and treatment team.    PSYCHOTHERAPY/COUNSELING:  [x] Therapeutic interview  [x] Supportive  [] CBT  [] Ongoing  [] Other    [x] Patient continues to need, on a daily basis, active treatment furnished directly by or requiring the supervision of inpatient psychiatric personnel      Anticipated Length of stay:            Electronically signed by Dunia Horan MD on 5/31/2022 at 4:42 PM

## 2022-05-31 NOTE — PROGRESS NOTES
Patient out on unit, attending groups, showered this morning  Has not been on any medication prior to this visit . Voices have been going on for over a year, thought it was her 23year old daughter toying with her. The voices started briefly after her daughter overdosed. Denies all at this time,   saying voices went away almost overnight. Tears of happiness  Upset she waited so long to get help. Mental illness runs in her family  .

## 2022-05-31 NOTE — PROGRESS NOTES
Morning Community Meeting Topics    Freddy Husbands attended the morning community meeting on 5/31/22. Topics discussed today     [x] Introduction   Day of the week and date   Mask distribution   Current mask requirements  [x]Teams   Explanation of  Green and Blue team criteria   Nurses assigned to each team for today   Explanation about green and blue paper  o Date  o Patient's Name  o Patient's Nurse  o Goals  [x] Visitation   Announce the visiting hours for the day   Announce which team is allowed to have visitors for the day   Review any updated Covid 19 requirements for visitors during visitation  o Vaccine Card or negative Covid test within 48 hours of visit  o State Identification   Patients are reminded to alert the  at least 1 hour before visitation   [x] Unit Orientation   Coffee use   Phone location and etiquette   Shower locations  [employer] and dryer location and process   Common area expectations   Staff rounds expectation  [x] Meals    Educate patient to the menu  o The patient is encouraged to fill out the menu to get preferences at mealtime  o The patient is educated that if they do not fill out the menu, they will get the standard tray  o The coffee pot is decaf, patient encouraged to order regular coffee from menu.    Educate patient to the meal process   Patient encouraged to eat snacks provided twice daily  o Snacks may stay in patient room     [x] Discharge Process   Discharge expectations   Fill out the survey after discharge   [x] Hygiene   Daily showers encouraged  o Showers availability discussed    Daily dressing encouraged  o Discussed wearing street clothing   Education provided on where to place linens and clothing  o Linens in the hamper  o personal clothing does not go into the linen hamper  [x] Group    Patient encouraged to attend group provided   Time of Group Meetings discussed   Gentle reminder that attendance is a Physician order  [x] Movement   Chair exercises completed   Stretching completed  Notes:Goal - \"To have some tests on my leg\" Electronically signed by DEREK Braun on 5/31/2022 at 10:02 AM

## 2022-05-31 NOTE — GROUP NOTE
Group Therapy Note    Date: 5/31/2022    Group Start Time: 1000  Group End Time: 1050  Group Topic: Psychoeducation    [facility]    Edward Ayala        Group Therapy Note    Attendees: 13         Patient's Goal:  \"To have some tests on my leg\"    Notes:  Patient attended the 1000 skills group. Patient was attentive, calm and she worked well on her task.     Status After Intervention:  Unchanged    Participation Level: Fairly well    Participation Quality: Appropriate      Speech:  quiet      Thought Process/Content: Linear      Affective Functioning: Flat      Mood: calm      Level of consciousness:  Alert      Response to Learning: Progressing to goal      Endings: None Reported    Modes of Intervention: Education, Socialization and Activity      Discipline Responsible: Psychoeducational Specialist      Signature:  Edward Ayala

## 2022-05-31 NOTE — GROUP NOTE
Group Therapy Note    Date: 5/31/2022    Group Start Time: 1345  Group End Time: 5583  Group Topic: Cognitive Skills    [facility]    Griselda Bartlett [facility]        Group Therapy Note    Attendees: 9         Patient's Goal: sleep hygiene    Notes:  Patient participated    Status After Intervention:  Improved    Participation Level: Interactive    Participation Quality: Appropriate      Speech:  normal      Thought Process/Content: Logical      Affective Functioning: Congruent      Mood: anxious      Level of consciousness:  Alert      Response to Learning: Progressing to goal      Endings: None Reported    Modes of Intervention: Support      Discipline Responsible: /Counselor      Signature:  Griselda Bartlett, [facility]

## 2022-05-31 NOTE — PROGRESS NOTES
Kathleen Tituscarole is pt niece she is willing to help [phone], she would like a call if pt is willing, she will help to get pt son back. They are hopeful for pt to go [facility] [address].

## 2022-05-31 NOTE — GROUP NOTE
Group Therapy Note    Date: 5/30/2022    Group Start Time: 2050  Group End Time: 2100  Group Topic: Wrap-Up    [facility]    Amy Harris        Group Therapy Note    Attendees: 13/22         Patient's Goal:  \"to see the doctor\"    Notes:  Patient reported meeting their goal for the day. Patient shared she enjoyed laughing with peers and \"the ghosts are gone\" while crying in happiness.     Status After Intervention:  Unchanged    Participation Level: Interactive    Participation Quality: Appropriate, Attentive and Sharing      Speech:  normal      Thought Process/Content: Logical      Affective Functioning: Congruent      Mood: euthymic      Level of consciousness:  Alert and Attentive      Response to Learning: Progressing to goal      Endings: None Reported    Modes of Intervention: Support      Discipline Responsible: [employer]      Signature:  Amy Harris

## 2022-05-31 NOTE — PROGRESS NOTES
Spiritual Support Group Note    Number of Participants in Group: 10                    Time: 15:00-15:50    Goal: Relief from isolation and loneliness             Vandana Sharing             Self-understanding and gain insight              Acceptance and belonging            Recognize they are not alone                Socialization             Empowerment       Encouragement    Topic:  [] Spiritual Wellness and Self Care                  [x] Hope                     [] Connecting with Divine/Others        [] Thankfulness and Gratitude               []  Meaningfulness and Purpose               [] Forgiveness               [] Peace               [] Connect to [employer]      [] Other    Participation Level:   [x] Active Listener   [] Minimal   [] Monopolizing   [x] Interactive   [] No Participation   []  Other:     Attention:   [x] Alert   [] Distractible   [] Drowsy   [] Poor   [] Other:    Manner:   [x] Cooperative   [] Suspicious   [] Withdrawn   [] Guarded   [] Irritable   [] Inhospitable   [] Other:     Others Comments from Group:

## 2022-06-01 PROCEDURE — 6370000000 HC RX 637 (ALT 250 FOR IP): Performed by: PSYCHIATRY & NEUROLOGY

## 2022-06-01 PROCEDURE — 99232 SBSQ HOSP IP/OBS MODERATE 35: CPT | Performed by: PSYCHIATRY & NEUROLOGY

## 2022-06-01 PROCEDURE — 1240000000 HC EMOTIONAL WELLNESS R&B

## 2022-06-01 RX ORDER — DIVALPROEX SODIUM 500 MG/1
500 TABLET, EXTENDED RELEASE ORAL DAILY
Status: DISCONTINUED | OUTPATIENT
Start: 2022-06-01 | End: 2022-06-03 | Stop reason: HOSPADM

## 2022-06-01 RX ADMIN — RISPERIDONE 0.5 MG: 0.5 TABLET ORAL at 21:00

## 2022-06-01 RX ADMIN — NICOTINE POLACRILEX 2 MG: 2 GUM, CHEWING BUCCAL at 21:46

## 2022-06-01 RX ADMIN — DIVALPROEX SODIUM 500 MG: 500 TABLET, EXTENDED RELEASE ORAL at 17:35

## 2022-06-01 RX ADMIN — MAGNESIUM HYDROXIDE 30 ML: 400 SUSPENSION ORAL at 22:58

## 2022-06-01 RX ADMIN — NICOTINE POLACRILEX 2 MG: 2 GUM, CHEWING BUCCAL at 09:19

## 2022-06-01 RX ADMIN — NICOTINE POLACRILEX 2 MG: 2 GUM, CHEWING BUCCAL at 18:26

## 2022-06-01 RX ADMIN — NICOTINE POLACRILEX 2 MG: 2 GUM, CHEWING BUCCAL at 16:21

## 2022-06-01 RX ADMIN — NICOTINE POLACRILEX 2 MG: 2 GUM, CHEWING BUCCAL at 13:07

## 2022-06-01 RX ADMIN — RISPERIDONE 0.5 MG: 0.5 TABLET ORAL at 09:19

## 2022-06-01 RX ADMIN — ACETAMINOPHEN 650 MG: 325 TABLET ORAL at 12:20

## 2022-06-01 ASSESSMENT — PAIN SCALES - WONG BAKER: WONGBAKER_NUMERICALRESPONSE: 2

## 2022-06-01 ASSESSMENT — PAIN SCALES - GENERAL: PAINLEVEL_OUTOF10: 8

## 2022-06-01 NOTE — GROUP NOTE
Group Therapy Note    Date: 6/1/2022    Group Start Time: 0641  Group End Time: 1700  Group Topic: Healthy Living/Wellness    [facility]    Grey Bartlett        Group Therapy Note    Attendees: 15/19         Patient's Goal:  To learn how coping skills can affect mood. Notes:  Patient participated in group discussion and activity.      Status After Intervention:  Improved    Participation Level: Interactive    Participation Quality: Appropriate and Attentive      Speech:  normal      Thought Process/Content: Logical      Affective Functioning: Congruent      Mood: euthymic      Level of consciousness:  Alert and Attentive      Response to Learning: Able to verbalize current knowledge/experience      Endings: None Reported    Modes of Intervention: Education      Discipline Responsible: [address], [employer]      Signature:  Junior Bartlett

## 2022-06-01 NOTE — CARE COORDINATION
Pt resting in bed during assessment. Denies all. Mild depression and anxiety. Pt shared again that this is her first psych admission and has no history. Pt concerned that she could lose custody of her 11year old and states her 21year old nephew has emergency custody of her son. Pt has a bright affect, but smiles at inappropriate times. Patient's leg remains swollen, but doppler was negative and the staple wound is healed with no signs or symptoms of infection.

## 2022-06-01 NOTE — GROUP NOTE
Group Therapy Note    Date: 6/1/2022    Group Start Time: 0895  Group End Time: 9559  Group Topic: Cognitive Skills    [facility]    VIRAJ Maxwell        Group Therapy Note    Attendees: 11         Patient's Goal:  To participate in mood management group. Notes:  Patient learned how to challenge the emotional mind. Status After Intervention:  Improved    Participation Level: Active Listener    Participation Quality: Appropriate      Speech:  normal      Thought Process/Content: Logical      Affective Functioning: Congruent      Mood: elevated      Level of consciousness:  Alert      Response to Learning: Able to verbalize current knowledge/experience      Endings: None Reported    Modes of Intervention: Education      Discipline Responsible: /Counselor      Signature:   VIRAJ Maxwell

## 2022-06-01 NOTE — PROGRESS NOTES
Duane Arriola \[facility] [place] [facility]\"" 89. FOLLOW-UP NOTE       6/1/2022     Patient was seen and examined in person, Chart reviewed   Patient's case discussed with staff/team    Chief Complaint: psychosis    Interim History:     Less depressed and paranoid  Denies any AH  Pt worried about her custody issue- next week  Pt want her child back from nephew, who was granted emergency custody due to her mental instability  Tolerating medication  Has been secluding in her room  Appetite:   [] Normal/Unchanged  [] Increased  [x] Decreased      Sleep:       [] Normal/Unchanged  [x] Fair       [] Poor              Energy:    [] Normal/Unchanged  [] Increased  [x] Decreased        SI [] Present  [x] Absent    HI  []Present  [x] Absent     Aggression:  [] yes  [] no    Patient is [] able  [x] unable to CONTRACT FOR SAFETY     PAST MEDICAL/PSYCHIATRIC HISTORY:   History reviewed. No pertinent past medical history. FAMILY/SOCIAL HISTORY:  History reviewed. No pertinent family history. Social History     Socioeconomic History    Marital status: Single     Spouse name: Not on file    Number of children: Not on file    Years of education: Not on file    Highest education level: Not on file   Occupational History    Not on file   Tobacco Use    Smoking status: Current Every Day Smoker    Smokeless tobacco: Never Used   Substance and Sexual Activity    Alcohol use: Never    Drug use: Yes     Types: Methamphetamines (Crystal Meth)    Sexual activity: Not on file   Other Topics Concern    Not on file   Social History Narrative    Not on file     Social Determinants of Health     Financial Resource Strain:     Difficulty of Paying Living Expenses: Not on file   Food Insecurity:     Worried About Running Out of Food in the Last Year: Not on file    Judy of Food in the Last Year: Not on file   Transportation Needs:     Lack of Transportation (Medical): Not on file    Lack of Transportation (Non-Medical):  Not on file   Physical Activity:     Days of Exercise per Week: Not on file    Minutes of Exercise per Session: Not on file   Stress:     Feeling of Stress : Not on file   Social Connections:     Frequency of Communication with Friends and Family: Not on file    Frequency of Social Gatherings with Friends and Family: Not on file    Attends Nondenominational Services: Not on file    Active Member of [address] [place] [address] or Organizations: Not on file    Attends Club or Organization Meetings: Not on file    Marital Status: Not on file   Intimate Partner Violence:     Fear of Current or Ex-Partner: Not on file    Emotionally Abused: Not on file    Physically Abused: Not on file    Sexually Abused: Not on file   Housing Stability:     Unable to Pay for Housing in the Last Year: Not on file    Number of [place] in the Last Year: Not on file    Unstable Housing in the Last Year: Not on file           ROS:  [x] All negative/unchanged except if checked.  Explain positive(checked items) below:  [] Constitutional  [] Eyes  [] Ear/Nose/Mouth/Throat  [] Respiratory  [] CV  [] GI  []   [] Musculoskeletal  [] Skin/Breast  [] Neurological  [] Endocrine  [] Heme/Lymph  [] Allergic/Immunologic    Explanation:     MEDICATIONS:    Current Facility-Administered Medications:     risperiDONE (RISPERDAL) tablet 0.5 mg, 0.5 mg, Oral, BID, Silvia Worrell MD, 0.5 mg at 06/01/22 0919    nicotine polacrilex (NICORETTE) gum 2 mg, 2 mg, Oral, Q2H PRN, Silvia Worrell MD, 2 mg at 06/01/22 1621    acetaminophen (TYLENOL) tablet 650 mg, 650 mg, Oral, Q4H PRN, Martha Mcpherson MD, 650 mg at 06/01/22 1220    magnesium hydroxide (MILK OF MAGNESIA) 400 MG/5ML suspension 30 mL, 30 mL, Oral, Daily PRN, Martha Mcpherson MD    aluminum & magnesium hydroxide-simethicone (MAALOX) 200-200-20 MG/5ML suspension 30 mL, 30 mL, Oral, PRN, Martha Mcpherson MD    haloperidol (HALDOL) tablet 5 mg, 5 mg, Oral, Q6H PRN **OR** haloperidol lactate (HALDOL) injection 5 mg, 5 mg, IntraMUSCular, Q6H PRN, Ekateirna Schultz MD    benztropine mesylate (COGENTIN) injection 2 mg, 2 mg, IntraMUSCular, BID PRN, Ekaterina Schultz MD    traZODone (DESYREL) tablet 50 mg, 50 mg, Oral, Nightly PRN, Ekaterina Schultz MD    hydrOXYzine (VISTARIL) injection 50 mg, 50 mg, IntraMUSCular, Q6H PRN **OR** hydrOXYzine (VISTARIL) capsule 50 mg, 50 mg, Oral, Q6H PRN, Ekaterina Schultz MD      Examination:  /71   Pulse (!) 107   Temp 97.7 °F (36.5 °C) (Oral)   Resp 18   Ht 5' 9\" (1.753 m)   Wt 178 lb (80.7 kg)   LMP 04/28/2022 (Approximate)   SpO2 98%   BMI 26.29 kg/m²   Gait - steady  Medication side effects(SE): no    Mental Status Examination:    Level of consciousness:  within normal limits   Appearance:  fair grooming and fair hygiene  Behavior/Motor:  psychomotor retardation  Attitude toward examiner:  withdrawn  Speech:  slow   Mood: dysthymic  Affect:  anxious  Thought processes:  slow   Thought content:  Delusions: less paranoid  Perceptual Disturbance:  Denies auditory  Cognition:  oriented to person, place, and time   Concentration poor  Insight fair   Judgement fair     ASSESSMENT:   Patient symptoms are:  [] Well controlled  [] Improving  [] Worsening  [] No change      Diagnosis:   Bipolar depression      LABS:    Recent Labs     05/29/22  1659   WBC 10.3   HGB 12.4        Recent Labs     05/29/22  1659      K 4.5   *   CO2 24   BUN 16   CREATININE 0.58   GLUCOSE 102*     Recent Labs     05/29/22  1659   BILITOT <0.2   ALKPHOS 52   AST 15   ALT 16     Lab Results   Component Value Date    LABAMPH Neg 05/29/2022    BARBSCNU Neg 05/29/2022    LABBENZ Neg 05/29/2022    COCAINESCRN Negative 04/06/2022    LABMETH Neg 05/29/2022    OPIATESCREENURINE Neg 05/29/2022    PHENCYCLIDINESCREENURINE Neg 05/29/2022    ETOH <10 05/29/2022     Lab Results   Component Value Date    TSH 0.386 05/29/2022     No results found for: LITHIUM  No results found for: VALPROATE, CBMZ        Treatment Plan:  Reviewed current Medications with the patient. Medication as ordered  Risks, benefits, side effects, drug-to-drug interactions and alternatives to treatment were discussed. Collateral information:   CD evaluation  Encourage patient to attend group and other milieu activities.   Discharge planning discussed with the patient and treatment team.    PSYCHOTHERAPY/COUNSELING:  [x] Therapeutic interview  [x] Supportive  [] CBT  [] Ongoing  [] Other    [x] Patient continues to need, on a daily basis, active treatment furnished directly by or requiring the supervision of inpatient psychiatric personnel      Anticipated Length of stay:            Electronically signed by Demi Reyes MD on 6/1/2022 at 4:30 PM

## 2022-06-01 NOTE — GROUP NOTE
Group Therapy Note    Date: 6/1/2022    Group Start Time: 1300  Group End Time: 1330  Group Topic: Insomnia Group    [facility]    Brenda Cabrales RN        Group Therapy Note    Attendees: 11/21         Patient's Goal:  Practice sleep hygiene    Notes:      Status After Intervention:  Improved    Participation Level: Interactive    Participation Quality: Appropriate      Speech:  normal      Thought Process/Content: Logical      Affective Functioning: Congruent      Mood: euthymic      Level of consciousness:  Alert      Response to Learning: Progressing to goal      Endings: None Reported    Modes of Intervention: Education      Discipline Responsible: Registered Nurse      Signature:  Brenda Cabrales RN

## 2022-06-01 NOTE — PROGRESS NOTES
Patient feels the Risperdal has been working well for her and she is feeling better. Patient denies SI/HI or AVH. Patient is tired this am and goes back to bed after breakfast.  Pt is not seen in art therapy this am.  Appetite is good and sleep is good patient states. Patient is given some pads, as patient started her menses.  Electronically signed by Annabel Jade LPN on 4/1/9259 at 48:23 AM

## 2022-06-01 NOTE — GROUP NOTE
Group Therapy Note    Date: 5/31/2022    Group Start Time: 1915  Group End Time: 2000  Group Topic: Recreational    [facility]    Ban Claros        Group Therapy Note    Attendees: 21         Patient's Goal:  To play card games and socialize with peers     Notes:  Pt played cards and socialized with peers     Status After Intervention:  Unchanged    Participation Level:  Active Listener and Interactive    Participation Quality: Appropriate and Attentive      Speech:  normal      Thought Process/Content: Logical      Affective Functioning: Congruent      Mood: euthymic      Level of consciousness:  Alert      Response to Learning: Able to verbalize current knowledge/experience      Endings: None Reported    Modes of Intervention: Support, Socialization and Activity      Discipline Responsible: Behavorial Health Tech      Signature:  Ban Claros

## 2022-06-01 NOTE — PROGRESS NOTES
Pt approached nurses station requesting nicotine gum. Last dose given 1307. Pt given nicotine gum 1621 as requested. Pt denies further needs at this time.

## 2022-06-01 NOTE — GROUP NOTE
Group Therapy Note    Date: 5/31/2022    Group Start Time: 2100  Group End Time: 2115  Group Topic: Wrap-Up    [facility]    Kelechi River        Group Therapy Note    Attendees: 13         Patient's Goal:  \"To see the doctor and get tests done\"    Notes:  Pt stated that she met her goals for the day     Status After Intervention:  Improved    Participation Level:  Active Listener and Interactive    Participation Quality: Appropriate and Attentive      Speech:  normal      Thought Process/Content: Logical      Affective Functioning: Congruent      Mood: euthymic      Level of consciousness:  Alert and Oriented x4      Response to Learning: Able to verbalize current knowledge/experience      Endings: None Reported    Modes of Intervention: Socialization      Discipline Responsible: [facility]      Signature:  Ke[place]chi River

## 2022-06-01 NOTE — PROGRESS NOTES
Pt. declined to attend the 0900 community meeting, despite staff encouragement.  Goal - \"Make some phone calls\" Electronically signed by Venita Koroma, [address] on 6/1/2022 at 2:07 PM

## 2022-06-01 NOTE — PROGRESS NOTES
Pt. refused to attend the 1000 skills group, despite staff encouragement. Electronically signed by Jesús Almendarez, [address] on 6/1/2022 at 2:07 PM

## 2022-06-02 PROCEDURE — 99232 SBSQ HOSP IP/OBS MODERATE 35: CPT | Performed by: PSYCHIATRY & NEUROLOGY

## 2022-06-02 PROCEDURE — 1240000000 HC EMOTIONAL WELLNESS R&B

## 2022-06-02 PROCEDURE — 6370000000 HC RX 637 (ALT 250 FOR IP): Performed by: PSYCHIATRY & NEUROLOGY

## 2022-06-02 PROCEDURE — 90833 PSYTX W PT W E/M 30 MIN: CPT | Performed by: PSYCHIATRY & NEUROLOGY

## 2022-06-02 RX ADMIN — NICOTINE POLACRILEX 2 MG: 2 GUM, CHEWING BUCCAL at 12:44

## 2022-06-02 RX ADMIN — NICOTINE POLACRILEX 2 MG: 2 GUM, CHEWING BUCCAL at 21:27

## 2022-06-02 RX ADMIN — RISPERIDONE 0.5 MG: 0.5 TABLET ORAL at 21:27

## 2022-06-02 RX ADMIN — NICOTINE POLACRILEX 2 MG: 2 GUM, CHEWING BUCCAL at 08:40

## 2022-06-02 RX ADMIN — RISPERIDONE 0.5 MG: 0.5 TABLET ORAL at 08:40

## 2022-06-02 RX ADMIN — NICOTINE POLACRILEX 2 MG: 2 GUM, CHEWING BUCCAL at 15:11

## 2022-06-02 RX ADMIN — NICOTINE POLACRILEX 2 MG: 2 GUM, CHEWING BUCCAL at 18:25

## 2022-06-02 RX ADMIN — DIVALPROEX SODIUM 500 MG: 500 TABLET, EXTENDED RELEASE ORAL at 08:40

## 2022-06-02 NOTE — GROUP NOTE
Group Therapy Note    Date: 6/1/2022    Group Start Time: 1910  Group End Time: 2000  Group Topic: Recreational    [facility]    Jermaine Rodrigues        Group Therapy Note    Attendees: 15/21         Patient's Goal:  To play Wii Auditudeling with the group. Notes:  Patient played the game with peers after arriving late.     Status After Intervention:  Improved    Participation Level: Interactive    Participation Quality: Appropriate, Attentive and Supportive      Speech:  normal      Thought Process/Content: Logical      Affective Functioning: Congruent      Mood: euthymic      Level of consciousness:  Alert and Attentive      Response to Learning: Progressing to goal      Endings: None Reported    Modes of Intervention: Activity      Discipline Responsible: [employer]      Signature:  Jermaine Rodrigues

## 2022-06-02 NOTE — PROGRESS NOTES
Morning Community Meeting Topics    Clau Sharma attended the morning community meeting on 6/2/22. Topics discussed today     [x] Introduction   Day of the week and date   Mask distribution   Current mask requirements  [x]Teams   Explanation of  Green and Blue team criteria   Nurses assigned to each team for today   Explanation about green and blue paper  o Date  o Patient's Name  o Patient's Nurse  o Goals  [x] Visitation   Announce the visiting hours for the day   Announce which team is allowed to have visitors for the day   Review any updated Covid 19 requirements for visitors during visitation  o Vaccine Card or negative Covid test within 48 hours of visit  o State Identification   Patients are reminded to alert the  at least 1 hour before visitation   [x] Unit Orientation   Coffee use   Phone location and etiquette   Shower locations  [employer] and dryer location and process   Common area expectations   Staff rounds expectation  [x] Meals    Educate patient to the menu  o The patient is encouraged to fill out the menu to get preferences at mealtime  o The patient is educated that if they do not fill out the menu, they will get the standard tray  o The coffee pot is decaf, patient encouraged to order regular coffee from menu.    Educate patient to the meal process   Patient encouraged to eat snacks provided twice daily  o Snacks may stay in patient room     [x] Discharge Process   Discharge expectations   Fill out the survey after discharge   [x] Hygiene   Daily showers encouraged  o Showers availability discussed    Daily dressing encouraged  o Discussed wearing street clothing   Education provided on where to place linens and clothing  o Linens in the hamper  o personal clothing does not go into the linen hamper  [x] Group    Patient encouraged to attend group provided   Time of Group Meetings discussed   Gentle reminder that attendance is a Physician order  [x] Movement   Chair exercises completed   Stretching completed  Notes: Goal - \"Make more phone calls\"  Electronically signed by DEREK Mcleod on 6/2/2022 at 10:00 AM

## 2022-06-02 NOTE — PROGRESS NOTES
Duane Arriola ja 89. FOLLOW-UP NOTE       6/2/2022     Patient was seen and examined in person, Chart reviewed   Patient's case discussed with staff/team    Chief Complaint: psychosis    Interim History:     Pt is future oriented  Want to apply for SS card  Monday is the court hearing  Has got her  for the hearing  Pt denies any mood swings or racing thoughts  Feel medication is helping her  Will be staying with her aunt on discharge- has been supportive    Appetite:   [x] Normal/Unchanged  [] Increased  [] Decreased      Sleep:       [] Normal/Unchanged  [x] Fair       [] Poor              Energy:    [x] Normal/Unchanged  [] Increased  [] Decreased        SI [] Present  [x] Absent    HI  []Present  [x] Absent     Aggression:  [] yes  [] no    Patient is [x] able  [] unable to CONTRACT FOR SAFETY     PAST MEDICAL/PSYCHIATRIC HISTORY:   History reviewed. No pertinent past medical history. FAMILY/SOCIAL HISTORY:  History reviewed. No pertinent family history. Social History     Socioeconomic History    Marital status: Single     Spouse name: Not on file    Number of children: Not on file    Years of education: Not on file    Highest education level: Not on file   Occupational History    Not on file   Tobacco Use    Smoking status: Current Every Day Smoker    Smokeless tobacco: Never Used   Substance and Sexual Activity    Alcohol use: Never    Drug use: Yes     Types: Methamphetamines (Crystal Meth)    Sexual activity: Not on file   Other Topics Concern    Not on file   Social History Narrative    Not on file     Social Determinants of Health     Financial Resource Strain:     Difficulty of Paying Living Expenses: Not on file   Food Insecurity:     Worried About Running Out of Food in the Last Year: Not on file    Judy of Food in the Last Year: Not on file   Transportation Needs:     Lack of Transportation (Medical):  Not on file    Lack of Transportation (Non-Medical): Not on file   Physical Activity:     Days of Exercise per Week: Not on file    Minutes of Exercise per Session: Not on file   Stress:     Feeling of Stress : Not on file   Social Connections:     Frequency of Communication with Friends and Family: Not on file    Frequency of Social Gatherings with Friends and Family: Not on file    Attends Faith Services: Not on file    Active Member of [address] [place] [address] or Organizations: Not on file    Attends Club or Organization Meetings: Not on file    Marital Status: Not on file   Intimate Partner Violence:     Fear of Current or Ex-Partner: Not on file    Emotionally Abused: Not on file    Physically Abused: Not on file    Sexually Abused: Not on file   Housing Stability:     Unable to Pay for Housing in the Last Year: Not on file    Number of [place] in the Last Year: Not on file    Unstable Housing in the Last Year: Not on file           ROS:  [x] All negative/unchanged except if checked.  Explain positive(checked items) below:  [] Constitutional  [] Eyes  [] Ear/Nose/Mouth/Throat  [] Respiratory  [] CV  [] GI  []   [] Musculoskeletal  [] Skin/Breast  [] Neurological  [] Endocrine  [] Heme/Lymph  [] Allergic/Immunologic    Explanation:     MEDICATIONS:    Current Facility-Administered Medications:     divalproex (DEPAKOTE ER) extended release tablet 500 mg, 500 mg, Oral, Daily, Roberto Dumont MD, 500 mg at 06/02/22 0840    risperiDONE (RISPERDAL) tablet 0.5 mg, 0.5 mg, Oral, BID, Silvia Worrell MD, 0.5 mg at 06/02/22 0840    nicotine polacrilex (NICORETTE) gum 2 mg, 2 mg, Oral, Q2H PRN, Princess Gibson MD, 2 mg at 06/02/22 1244    acetaminophen (TYLENOL) tablet 650 mg, 650 mg, Oral, Q4H PRN, Herber Damian MD, 650 mg at 06/01/22 1220    magnesium hydroxide (MILK OF MAGNESIA) 400 MG/5ML suspension 30 mL, 30 mL, Oral, Daily PRN, Herber Damian MD, 30 mL at 06/01/22 2259    aluminum & magnesium hydroxide-simethicone (MAALOX) 200-200-20 MG/5ML suspension 30 mL, 30 mL, Oral, PRN, Shannon Lozano MD    haloperidol (HALDOL) tablet 5 mg, 5 mg, Oral, Q6H PRN **OR** haloperidol lactate (HALDOL) injection 5 mg, 5 mg, IntraMUSCular, Q6H PRN, Shannon Lozano MD    benztropine mesylate (COGENTIN) injection 2 mg, 2 mg, IntraMUSCular, BID PRN, Shannon Lozano MD    traZODone (DESYREL) tablet 50 mg, 50 mg, Oral, Nightly PRN, Shannon Lozano MD    hydrOXYzine (VISTARIL) injection 50 mg, 50 mg, IntraMUSCular, Q6H PRN **OR** hydrOXYzine (VISTARIL) capsule 50 mg, 50 mg, Oral, Q6H PRN, Shannon Lozano MD      Examination:  /66   Pulse 100   Temp 97.9 °F (36.6 °C) (Oral)   Resp 18   Ht 5' 9\" (1.753 m)   Wt 178 lb (80.7 kg)   LMP 04/28/2022 (Approximate)   SpO2 98%   BMI 26.29 kg/m²   Gait - steady  Medication side effects(SE): no    Mental Status Examination:    Level of consciousness:  within normal limits   Appearance:  fair grooming and fair hygiene  Behavior/Motor:  No psychomotor retardation  Attitude toward examiner:  cooperative  Speech:  slow   Mood: denies depression  Affect:  anxious  Thought processes:  slow   Thought content:  Delusions: denies paranoid  Perceptual Disturbance:  Denies auditory  Cognition:  oriented to person, place, and time   Concentration better  Insight fair   Judgement fair     ASSESSMENT:   Patient symptoms are:  [] Well controlled  [x] Improving  [] Worsening  [] No change      Diagnosis:   Bipolar depression      LABS:    No results for input(s): WBC, HGB, PLT in the last 72 hours. No results for input(s): NA, K, CL, CO2, BUN, CREATININE, GLUCOSE in the last 72 hours. No results for input(s): BILITOT, ALKPHOS, AST, ALT in the last 72 hours.   Lab Results   Component Value Date    LABAMPH Neg 05/29/2022    BARBSCNU Neg 05/29/2022    LABBENZ Neg 05/29/2022    COCAINESCRN Negative 04/06/2022    LABMETH Neg 05/29/2022    OPIATESCREENURINE Neg 05/29/2022    PHENCYCLIDINESCREENURINE Neg 05/29/2022    ETOH <10 05/29/2022     Lab Results   Component Value Date    TSH 0.386 05/29/2022     No results found for: LITHIUM  No results found for: VALPROATE, CBMZ        Treatment Plan:  Reviewed current Medications with the patient. Medication as ordered  Risks, benefits, side effects, drug-to-drug interactions and alternatives to treatment were discussed. Collateral information:   CD evaluation  Encourage patient to attend group and other milieu activities.   Discharge planning discussed with the patient and treatment team.    PSYCHOTHERAPY/COUNSELING:  [x] Therapeutic interview  [x] Supportive  [] CBT  [] Ongoing  [] Other  Patient was seen 1:1 for 20 minutes, other than E&M time spent, focusing on      - coping skills techniques     - Anxiety management techniques discussed including deep breathing exercise and PMR     - discussing patients strength and weakness      - Focusing on negative cognition and maladaptive thoughts, which is feeding and maintaining the depression symptoms         [x] Patient continues to need, on a daily basis, active treatment furnished directly by or requiring the supervision of inpatient psychiatric personnel      Anticipated Length of stay: friday            Electronically signed by Brijesh Dimas MD on 6/2/2022 at 1:18 PM

## 2022-06-02 NOTE — GROUP NOTE
Group Therapy Note    Date: 6/2/2022    Group Start Time: 3816  Group End Time: 1800  Group Topic: Healthy Living/Wellness    [facility] PORTILLO Blunt        Group Therapy Note    Attendees: 14/22         Patient's Goal:  To learn about the benefits of and participate in a progressive muscle relaxation. Notes:  Patient participated in healthy living group.     Status After Intervention:  Improved    Participation Level: Interactive    Participation Quality: Appropriate and Attentive      Speech:  normal      Thought Process/Content: Logical      Affective Functioning: Congruent      Mood: euthymic      Level of consciousness:  Alert and Attentive      Response to Learning: Progressing to goal      Endings: None Reported    Modes of Intervention: Education      Discipline Responsible: [address]      Signature:  Ana Blunt

## 2022-06-02 NOTE — PROGRESS NOTES
Patient did not attend group despite staff encouragement.   Electronically signed by Whit Manley on 6/1/2022 at 9:35 PM
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
## 2022-06-02 NOTE — PROGRESS NOTES
Pt at the desk requested/ received PRN milk of magnesia. Pt reports she has not had a bowel movement in 2 days.

## 2022-06-02 NOTE — CARE COORDINATION
Family/Support Name: Leopoldo Mann #: [phone]  Relationship to Patient: Rina Almaraz call to above for collateral information,    Response:  Spoke to aunt for a moment before having to disconnect the call d/t phone malfunction. Attempted to call back a few moments later and no answer.     Electronically signed by Charlotte Harding on 6/2/2022 at 8:13 AM

## 2022-06-02 NOTE — GROUP NOTE
Group Therapy Note    Date: 6/2/2022    Group Start Time: 1000  Group End Time: 1050  Group Topic: Psychoeducation    [facility]    Vimal Arizmendi        Group Therapy Note    Attendees: 12         Patient's Goal:  \"To make more phone calls\"    Notes:  Patient attended the 1000 skills group. Patient was more sociable, she focused well on her task and overall participation was good. Status After Intervention:  Improved    Participation Level:  Active Listener    Participation Quality: Appropriate      Speech:  calm      Thought Process/Content: Linear      Affective Functioning: Congruent      Mood: calm      Level of consciousness:  Alert      Response to Learning: Progressing to goal      Endings: None Reported    Modes of Intervention: Education, Socialization and Activity      Discipline Responsible: Psychoeducational Specialist      Signature:  Vimal Arizmendi

## 2022-06-02 NOTE — PROGRESS NOTES
Patient is out social with peers. Patient takes nicotine gum prn. Patient feels that her medications are helping and she is feeling good. Patient has court Monday. Patient denies current SI/HI or AVH. Patient dep and anx are much better.  Electronically signed by Karyna Hawkins LPN on 1/3/3157 at 7:85 PM

## 2022-06-02 NOTE — GROUP NOTE
Group Therapy Note    Date: 6/2/2022    Group Start Time: 1110  Group End Time: 4465  Group Topic: Group Therapy    [facility]    Nesha Carroll        Group Therapy Note    Attendees: 14/20         Patient's Goal:  \"make more phone calls in regards to my son\"    Notes:  Patient shared she is feeling \"amazingly great\" today. Status After Intervention:  Improved    Participation Level:  Active Listener and Interactive    Participation Quality: Appropriate and Attentive      Speech:  normal      Thought Process/Content: Logical      Affective Functioning: Congruent      Mood: elevated      Level of consciousness:  Alert and Attentive      Response to Learning: Progressing to goal      Endings: None Reported    Modes of Intervention: Support      Discipline Responsible: [address], [employer]      Signature:  Nesha Carroll

## 2022-06-03 VITALS
SYSTOLIC BLOOD PRESSURE: 110 MMHG | HEIGHT: 69 IN | WEIGHT: 178 LBS | DIASTOLIC BLOOD PRESSURE: 72 MMHG | RESPIRATION RATE: 18 BRPM | TEMPERATURE: 97.7 F | OXYGEN SATURATION: 100 % | BODY MASS INDEX: 26.36 KG/M2 | HEART RATE: 96 BPM

## 2022-06-03 PROCEDURE — 99239 HOSP IP/OBS DSCHRG MGMT >30: CPT | Performed by: PSYCHIATRY & NEUROLOGY

## 2022-06-03 PROCEDURE — 6370000000 HC RX 637 (ALT 250 FOR IP): Performed by: PSYCHIATRY & NEUROLOGY

## 2022-06-03 RX ORDER — DIVALPROEX SODIUM 500 MG/1
500 TABLET, EXTENDED RELEASE ORAL DAILY
Qty: 15 TABLET | Refills: 3 | Status: SHIPPED | OUTPATIENT
Start: 2022-06-04

## 2022-06-03 RX ORDER — RISPERIDONE 0.5 MG/1
0.5 TABLET, FILM COATED ORAL 2 TIMES DAILY
Qty: 30 TABLET | Refills: 3 | Status: SHIPPED | OUTPATIENT
Start: 2022-06-03

## 2022-06-03 RX ADMIN — DIVALPROEX SODIUM 500 MG: 500 TABLET, EXTENDED RELEASE ORAL at 08:57

## 2022-06-03 RX ADMIN — NICOTINE POLACRILEX 2 MG: 2 GUM, CHEWING BUCCAL at 11:46

## 2022-06-03 RX ADMIN — RISPERIDONE 0.5 MG: 0.5 TABLET ORAL at 08:57

## 2022-06-03 RX ADMIN — NICOTINE POLACRILEX 2 MG: 2 GUM, CHEWING BUCCAL at 08:57

## 2022-06-03 NOTE — GROUP NOTE
Group Therapy Note    Date: 6/2/2022    Group Start Time: 1400  Group End Time: 1440  Group Topic: Healthy Endings    [facility]    Sarah García RN        Group Therapy Note    Attendees: 14           Patient's Goal:  Learn about benefits of guided journaling and trial out prompts as a group    Notes:  Pt actively and appropriately participated    Status After Intervention:  Unchanged    Participation Level:  Active Listener and Interactive    Participation Quality: Appropriate, Attentive, Sharing and Supportive      Speech:  normal      Thought Process/Content: Logical  Linear      Affective Functioning: Congruent      Mood: euthymic      Level of consciousness:  Alert and Oriented x4      Response to Learning: Able to verbalize current knowledge/experience, Able to verbalize/acknowledge new learning and Able to retain information      Endings: None Reported    Modes of Intervention: Education, Support, Socialization, Exploration and Activity      Discipline Responsible: Registered Nurse      Signature:  Sraah García RN

## 2022-06-03 NOTE — GROUP NOTE
Group Therapy Note    Date: 6/2/2022    Group Start Time: 1900  Group End Time: 1945  Group Topic: Recreational    [facility]    Elizabeth Fagan        Group Therapy Note    Attendees: 11/22         Patient's Goal:  To participate in activity group. Notes:  Patient participated in group with peers.      Status After Intervention:  Unchanged    Participation Level: Interactive    Participation Quality: Appropriate and Attentive      Speech:  normal      Thought Process/Content: Logical      Affective Functioning: Congruent      Mood: euthymic      Level of consciousness:  Alert and Attentive      Response to Learning: Progressing to goal      Endings: None Reported    Modes of Intervention: Activity      Discipline Responsible: [address], [employer]      Signature:  Elizabeth Fagan

## 2022-06-03 NOTE — FLOWSHEET NOTE
Reviewed discharge instructions with patient. Pt. Verbalized understanding. Denies SI/HI/AVH. Waiting for ride.

## 2022-06-03 NOTE — GROUP NOTE
Group Therapy Note    Date: 6/3/2022    Group Start Time: 1000  Group End Time: 1100  Group Topic: Psychoeducation    [facility] ROBERT Llamas, CTRS        Group Therapy Note    Attendees: 17           Patient's Goal:  \"to have a good day\"    Notes:  Pt. attended the 1000 skill group. Feeling ready for dc and is happy. Pleased with how project turned out. Status After Intervention:  Improved    Participation Level:  Active Listener and Interactive    Participation Quality: Appropriate, Attentive and Sharing      Speech:  normal      Thought Process/Content: Logical      Affective Functioning: Congruent      Mood: happy      Level of consciousness:  Alert, Oriented x4 and Attentive      Response to Learning: Progressing to goal      Endings: None Reported    Modes of Intervention: Education, Support, Socialization and Activity      Discipline Responsible: Psychoeducational Specialist      Signature:  Kayce Almanza

## 2022-06-03 NOTE — GROUP NOTE
Group Therapy Note    Date: 6/2/2022    Group Start Time: 2115  Group End Time: 2130  Group Topic: Wrap-Up    [facility]    Richrd Knock        Group Therapy Note    Attendees: 9/22         Patient's Goal:  \"to make more phone calls\"    Notes:  Patient reported meeting their goal for the day. Patient shared she is looking forward to discharge tomorrow and is thankful for the care she received here.     Status After Intervention:  Unchanged    Participation Level: Interactive    Participation Quality: Appropriate, Attentive and Sharing      Speech:  normal      Thought Process/Content: Logical      Affective Functioning: Congruent      Mood: euthymic      Level of consciousness:  Alert and Attentive      Response to Learning: Progressing to goal      Endings: None Reported    Modes of Intervention: Support      Discipline Responsible: [employer]      Signature:  Lauren Felipe

## 2022-06-03 NOTE — PROGRESS NOTES
Pt given personal belongings, picked up by aunt. and discharged to aunt's home. Pt denies SI/HI/AVH, anxiety and depression.

## 2022-06-03 NOTE — DISCHARGE INSTR - DIET

## 2022-06-03 NOTE — PROGRESS NOTES
Patient using prn nicotine gum.  Electronically signed by Isabelle Tovar LPN on 1/9/9412 at 83:76 AM

## 2022-06-03 NOTE — PROGRESS NOTES
Morning Community Meeting Topics    Giselle Ayers attended the morning community meeting on 6/3/22. Topics discussed today     [x] Introduction   Day of the week and date   Mask distribution   Current mask requirements  [x]Teams   Explanation of  Green and Blue team criteria   Nurses assigned to each team for today   Explanation about green and blue paper  o Date  o Patient's Name  o Patient's Nurse  o Goals  [x] Visitation   Announce the visiting hours for the day   Announce which team is allowed to have visitors for the day   Review any updated Covid 19 requirements for visitors during visitation  o Vaccine Card or negative Covid test within 48 hours of visit  o State Identification   Patients are reminded to alert the  at least 1 hour before visitation   [x] Unit Orientation   Coffee use   Phone location and etiquette   Shower locations  [employer] and dryer location and process   Common area expectations   Staff rounds expectation  [x] Meals    Educate patient to the menu  o The patient is encouraged to fill out the menu to get preferences at mealtime  o The patient is educated that if they do not fill out the menu, they will get the standard tray  o The coffee pot is decaf, patient encouraged to order regular coffee from menu.    Educate patient to the meal process   Patient encouraged to eat snacks provided twice daily  o Snacks may stay in patient room     [x] Discharge Process   Discharge expectations   Fill out the survey after discharge   [x] Hygiene   Daily showers encouraged  o Showers availability discussed    Daily dressing encouraged  o Discussed wearing street clothing   Education provided on where to place linens and clothing  o Linens in the hamper  o personal clothing does not go into the linen hamper  [x] Group    Patient encouraged to attend group provided   Time of Group Meetings discussed   Gentle reminder that attendance is a Physician order  [x] Movement   Chair exercises completed   Stretching completed  Notes: GOAL : \" to have a good day\" Electronically signed by Yfn Ya on 6/3/2022 at 12:21 PM

## 2022-06-03 NOTE — PROGRESS NOTES
Pt reports depression and anxiety are #1/10,denies SI/HI/AVH. , pt reports hearing voices Kitjusto Harris and since admission pt states this is improved. .Pt reports attending all groups, is visible on unit. Pt reports good appetite,is sleeping 8 hrs. Pts' goal is to regain custody of son on  Monday.

## 2022-06-03 NOTE — GROUP NOTE
Group Therapy Note    Date: 6/3/2022    Group Start Time: 1110  Group End Time: 6611  Group Topic: Group Therapy    [facility]    Jorge Sterling        Group Therapy Note    Attendees: 17/24         Patient's Goal: \"to have a good day\"    Notes:  Patient shares \"feeling excited for my life on the other side\" (when she discharges). Patient showed gratitude toward unit staff. Status After Intervention:  Improved    Participation Level:  Active Listener and Interactive    Participation Quality: Appropriate and Sharing      Speech:  normal      Thought Process/Content: Logical      Affective Functioning: Congruent      Mood: elevated      Level of consciousness:  Alert and Attentive      Response to Learning: Progressing to goal      Endings: None Reported    Modes of Intervention: Support      Discipline Responsible: [address] [employer]      Signature:  Jorge Sterling

## 2022-06-03 NOTE — DISCHARGE SUMMARY
DISCHARGE SUMMARY      Patient ID:  Juanita Chaidez  [id number]  38 y.o.  1980      Admit date: 5/29/2022    Discharge date and time: 6/3/2022    Admitting Physician: Shannon Lozano MD     Discharge Physician: Dr David Ríos MD    Admission Diagnoses: Psychosis, unspecified psychosis type [facility]) [F29]    Admission Condition: poor    Discharged Condition: stable    Admission Circumstance:     Patient presented to the ED for a psychiatric evaluation after she was assessed and pink slipped by the [facility] team. Patient reports she has been experiencing auditory hallucinations for the past 9 months. She describes hearing five external voices and refers to each voice in context to their place in \"the family. \" States she hears \"the older father, mother and children. \" Patient describes the voices as male and female that speak truth to her. States \"it's crazy, they don't tell me to hurt myself or anything but they help me. \" Describes hearing the older male voice told her to check the washing machine which shouldn't have completed it's cycle but when she checked it was done. The female voice told me while I was at the store to check my bank account because my tax return was there, it wasn't supposed to be their another week but it was there like she said. \" Patient verbalizes these voices do things to prove to her that they are real. States the voices also tell me \"the spirit world wants you. \" Patient verbalizes that her [de-identified] year old son and her dog also hear these voices. Patient cooperative with assessment and answers questions appropriately. Thought process organized with tangential speech. Appears to smile when discussing her psychosis. Tearful at times when she discusses the situation with her children and family. Denies SI, denies HI. Denies any self harm or suicide attempt prior to admit. Denies any history of suicide attempts or inpatient or outpatient behavioral health treatment.  Affect incongruent with good eye contact. Patient states she would like to be evaluated to attempt to \"get her head straight\" so her family will allow her to get her son back. Patient admits she has been placing herself in danger recently and recognizes the need for her son to be with family while she receives treatment.            Stressors:lost her 12 yo in a custody torres     The patient is not currently receiving care for the above psychiatric illness.     Medications Prior to Admission:   Prescriptions Prior to Admission   No medications prior to admission.        Compliance:none     Psychiatric Review of Systems       Depression: yes     Veena or Hypomania:  yes -      Panic Attacks:  no     Phobias:  no     Obsessions and Compulsions:  no     PTSD :      Hallucinations:  no     Delusions:  no     Substance Abuse History:  ETOH: no   Marijuana: denies  Opiates: done   Other Drugs: amphetamines    Past Psychiatric History:  Prior Diagnosis:  Meth duced psychossis  Psychiatrist: magaly holguin  Therapist:no  Hospitalization: yes  Hx of Suicidal Attempts: yes  Hx of violence:  yes  ECT: yes  Previous discontinued Psychiatric Med Trials:     PAST MEDICAL/PSYCHIATRIC HISTORY:   History reviewed. No pertinent past medical history. FAMILY/SOCIAL HISTORY:  History reviewed. No pertinent family history.   Social History     Socioeconomic History    Marital status: Single     Spouse name: Not on file    Number of children: Not on file    Years of education: Not on file    Highest education level: Not on file   Occupational History    Not on file   Tobacco Use    Smoking status: Current Every Day Smoker    Smokeless tobacco: Never Used   Substance and Sexual Activity    Alcohol use: Never    Drug use: Yes     Types: Methamphetamines (Crystal Meth)    Sexual activity: Not on file   Other Topics Concern    Not on file   Social History Narrative    Not on file     Social Determinants of Health     Financial Resource Strain:     Difficulty of Paying Living Expenses: Not on file   Food Insecurity:     Worried About Running Out of Food in the Last Year: Not on file    Ran Out of Food in the Last Year: Not on file   Transportation Needs:     Lack of Transportation (Medical): Not on file    Lack of Transportation (Non-Medical):  Not on file   Physical Activity:     Days of Exercise per Week: Not on file    Minutes of Exercise per Session: Not on file   Stress:     Feeling of Stress : Not on file   Social Connections:     Frequency of Communication with Friends and Family: Not on file    Frequency of Social Gatherings with Friends and Family: Not on file    Attends Temple Services: Not on file    Active Member of [address] [place] [address] [employer] or Organizations: Not on file    Attends Club or Organization Meetings: Not on file    Marital Status: Not on file   Intimate Partner Violence:     Fear of Current or Ex-Partner: Not on file    Emotionally Abused: Not on file    Physically Abused: Not on file    Sexually Abused: Not on file   Housing Stability:     Unable to Pay for Housing in the Last Year: Not on file    Number of [place] in the Last Year: Not on file    Unstable Housing in the Last Year: Not on file       MEDICATIONS:    Current Facility-Administered Medications:     divalproex (DEPAKOTE ER) extended release tablet 500 mg, 500 mg, Oral, Daily, Samuel Toure MD, 500 mg at 06/03/22 0857    risperiDONE (RISPERDAL) tablet 0.5 mg, 0.5 mg, Oral, BID, Zay Jenkins MD, 0.5 mg at 06/03/22 0857    nicotine polacrilex (NICORETTE) gum 2 mg, 2 mg, Oral, Q2H PRN, Zay Jenkins MD, 2 mg at 06/03/22 1146    acetaminophen (TYLENOL) tablet 650 mg, 650 mg, Oral, Q4H PRN, Katelynn Garcia MD, 650 mg at 06/01/22 1220    magnesium hydroxide (MILK OF MAGNESIA) 400 MG/5ML suspension 30 mL, 30 mL, Oral, Daily PRN, Katelynn Garcia MD, 30 mL at 06/01/22 2258    aluminum & magnesium hydroxide-simethicone (MAALOX) 680-574-90 MG/5ML suspension 30 mL, 30 mL, Oral, PRN, Shannon Lozano MD    haloperidol (HALDOL) tablet 5 mg, 5 mg, Oral, Q6H PRN **OR** haloperidol lactate (HALDOL) injection 5 mg, 5 mg, IntraMUSCular, Q6H PRN, Shannon Lozano MD    benztropine mesylate (COGENTIN) injection 2 mg, 2 mg, IntraMUSCular, BID PRN, Shannon Lozano MD    traZODone (DESYREL) tablet 50 mg, 50 mg, Oral, Nightly PRN, Shannon Lozano MD    hydrOXYzine (VISTARIL) injection 50 mg, 50 mg, IntraMUSCular, Q6H PRN **OR** hydrOXYzine (VISTARIL) capsule 50 mg, 50 mg, Oral, Q6H PRN, Shannon Lozano MD    Examination:  /72   Pulse 96   Temp 97.7 °F (36.5 °C)   Resp 18   Ht 5' 9\" (1.753 m)   Wt 178 lb (80.7 kg)   LMP 04/28/2022 (Approximate)   SpO2 100%   BMI 26.29 kg/m²   Gait - steady    HOSPITAL COURSE[de-identified]  Following admission to the hospital, patient had a complete physical exam and blood work up  Patient was monitored closely with suicide precaution  Patient was started on medication as listed below  Was encouraged to participate in group and other milieu activity  Patient started to feel better with this combination of treatment. Significant progress in the symptoms since admission. Mood better, with the score of 2/10 - bad  No AVH or paranoid thoughts  No Hopeless or worthless feeling  No active SI/HI  Appetite:  [x] Normal  [] Increased  [] Decreased    Sleep:       [x] Normal  [] Fair       [] Poor            Energy:    [x] Normal  [] Increased  [] Decreased     SI [] Present  [x] Absent  HI  []Present  [x] Absent   Aggression:  [] yes  [] no  Patient is [x] able  [] unable to CONTRACT FOR SAFETY   Medication side effects(SE):  [x] None(Psych.  Meds.) [] Other      Mental Status Examination on discharge:    Level of consciousness:  within normal limits   Appearance:  well-appearing  Behavior/Motor:  no abnormalities noted  Attitude toward examiner:  attentive and good eye contact  Speech:  spontaneous, normal rate and normal volume   Mood: euthymic  Affect:  mood congruent  Thought processes:  linear   Thought content:  Suicidal Ideation:  denies suicidal ideation  Cognition:  oriented to person, place, and time   Concentration intact  Memory intact  Insight good   Judgement fair   Fund of Knowledge adequate      ASSESSMENT:  Patient symptoms are:  [x] Well controlled  [x] Improving  [] Worsening  [] No change      Diagnosis:  Principal Problem:    Psychosis ([facility] [address]  Resolved Problems:    * No resolved hospital problems. *      LABS:    No results for input(s): WBC, HGB, PLT in the last 72 hours. No results for input(s): NA, K, CL, CO2, BUN, CREATININE, GLUCOSE in the last 72 hours. No results for input(s): BILITOT, ALKPHOS, AST, ALT in the last 72 hours. Lab Results   Component Value Date    LABAMPH Neg 05/29/2022    BARBSCNU Neg 05/29/2022    LABBENZ Neg 05/29/2022    COCAINESCRN Negative 04/06/2022    LABMETH Neg 05/29/2022    OPIATESCREENURINE Neg 05/29/2022    PHENCYCLIDINESCREENURINE Neg 05/29/2022    ETOH <10 05/29/2022     Lab Results   Component Value Date    TSH 0.386 05/29/2022     No results found for: LITHIUM  No results found for: VALPROATE, CBMZ    RISK ASSESSMENT AT DISCHARGE: Low risk for suicide and homicide. Treatment Plan:  Reviewed current Medications with the patient. Education provided on the complaince with treatment. Risks, benefits, side effects, drug-to-drug interactions and alternatives to treatment were discussed. Encourage patient to attend outpatient follow up appointment and therapy. Patient was advised to call the outpatient provider, visit the nearest ED or call 911 if symptoms are not manageable. Patient's family member was contacted prior to the discharge.          Medication List      START taking these medications    divalproex 500 MG extended release tablet  Commonly known as: DEPAKOTE ER  Take 1 tablet by mouth daily  Start taking on: June 4, 2022     risperiDONE 0.5 MG tablet  Commonly known as:  RISPERDAL  Take 1 tablet by mouth 2 times daily           Where to Get Your Medications      These medications were sent to [address], [address] [place] [address]    Phone: [phone]   · divalproex 500 MG extended release tablet  · risperiDONE 0.5 MG tablet           Reason for more than one antipsychotic:   [x] N/A  [] 3 failed monotherapy(drugs tried):  [] Cross over to a new antipsychotic  [] Taper to monotherapy from polypharmacy  [] Augmentation of Clozapine therapy due to treatment resistance to single therapy        TIME SPEND - 35 MINUTES TO COMPLETE THE EVALUATION, DISCHARGE SUMMARY, MEDICATION RECONCILIATION AND FOLLOW UP CARE     Signed:  Emmanuelle Grigsby MD  6/3/2022  12:55 PM

## 2022-06-03 NOTE — GROUP NOTE
Group Therapy Note    Date: 6/3/2022    Group Start Time: 1330  Group End Time: 8355  Group Topic: Cognitive Skills    [facility]    Roberto Galicia RN        Group Therapy Note    Attendees: 10         Patient's Goal:  Identify my strengths and improve on the things I struggle with. Status After Intervention:  Improved    Participation Level:  Active Listener and Interactive    Participation Quality: Appropriate, Attentive and Sharing      Speech:  normal      Thought Process/Content: Logical  Linear      Affective Functioning: Congruent      Mood: euthymic      Level of consciousness:  Alert, Oriented x4 and Attentive      Response to Learning: Able to verbalize current knowledge/experience, Able to verbalize/acknowledge new learning and Progressing to goal      Endings: None Reported    Modes of Intervention: Education, Support and Problem-solving      Discipline Responsible: Registered Nurse      Signature:  Roberto Galicia RN

## 2022-06-03 NOTE — PROGRESS NOTES
CLINICAL PHARMACY NOTE: MEDS TO BEDS    Total # of Prescriptions Filled: 2   The following medications were delivered to the patient:  · Divalproex ER 500mg tab  · Risperidone 0.5mg tab    Additional Documentation: